# Patient Record
Sex: MALE | Race: BLACK OR AFRICAN AMERICAN | NOT HISPANIC OR LATINO | Employment: OTHER | URBAN - METROPOLITAN AREA
[De-identification: names, ages, dates, MRNs, and addresses within clinical notes are randomized per-mention and may not be internally consistent; named-entity substitution may affect disease eponyms.]

---

## 2017-01-06 ENCOUNTER — DOCUMENTATION ONLY (OUTPATIENT)
Dept: REHABILITATION | Facility: HOSPITAL | Age: 57
End: 2017-01-06

## 2017-01-06 DIAGNOSIS — M54.41 CHRONIC MIDLINE LOW BACK PAIN WITH RIGHT-SIDED SCIATICA: Primary | ICD-10-CM

## 2017-01-06 DIAGNOSIS — M62.81 MUSCLE WEAKNESS: ICD-10-CM

## 2017-01-06 DIAGNOSIS — G89.29 CHRONIC MIDLINE LOW BACK PAIN WITH RIGHT-SIDED SCIATICA: Primary | ICD-10-CM

## 2017-01-06 NOTE — PROGRESS NOTES
PHYSICAL THERAPY DISCHARGE SUMMARY    Name: Chilo Calvillo  Referring Provider:Cezar Uriostegui MD   PT Order: PT evaluate and treat   Clinical #: [id number]  Discharge Summary Date: 1/6/2017  Diagnosis:   1. Chronic midline low back pain with right-sided sciatica     2. Muscle weakness         Patient was seen for 4 OP PT visits from 11-22-16 to 11-30-16. Pt cancelled/no show visit 1 scheduled sessions. Treatment included: evaluation, HEP, pt education, aquatic therapy, ther ex. PT unable to fully assess goal achievement as pt did not return for follow up sessions/did not reschedule follow up visits. This patient is discharged from OP PT Services.

## 2017-07-14 ENCOUNTER — CLINICAL SUPPORT (OUTPATIENT)
Dept: AUDIOLOGY | Facility: CLINIC | Age: 57
End: 2017-07-14
Payer: OTHER GOVERNMENT

## 2017-07-14 ENCOUNTER — OFFICE VISIT (OUTPATIENT)
Dept: OTOLARYNGOLOGY | Facility: CLINIC | Age: 57
End: 2017-07-14
Payer: OTHER GOVERNMENT

## 2017-07-14 VITALS — BODY MASS INDEX: 40.43 KG/M2 | WEIGHT: 315 LBS | HEIGHT: 74 IN

## 2017-07-14 DIAGNOSIS — H90.3 SENSORINEURAL HEARING LOSS, BILATERAL: Primary | ICD-10-CM

## 2017-07-14 PROCEDURE — 99213 OFFICE O/P EST LOW 20 MIN: CPT | Mod: PBBFAC | Performed by: OTOLARYNGOLOGY

## 2017-07-14 PROCEDURE — 99214 OFFICE O/P EST MOD 30 MIN: CPT | Mod: S$PBB,,, | Performed by: OTOLARYNGOLOGY

## 2017-07-14 PROCEDURE — 99999 PR PBB SHADOW E&M-EST. PATIENT-LVL III: CPT | Mod: PBBFAC,,, | Performed by: OTOLARYNGOLOGY

## 2017-07-14 NOTE — PROGRESS NOTES
Subjective:       Patient ID: Chilo Calvillo is a 56 y.o. male.    Chief Complaint: Hearing Loss    HPI: Ref Dr Crain for ?? HL.    Seen Dr LO in past.    Presents for ?? Second opinion re Meniere's Dz?    Seeing Dr Tapia, wants to do surgery.    No dizz eval today.    Hx  of Functional HL last visit and today.    In neck collar, walker.    Past Medical History: Patient has a past medical history of Arthritis; Dizziness; Hearing loss; Hyperlipidemia; Hypertension; and Mental disorder.    Past Surgical History: Patient has a past surgical history that includes Sinus surgery (1-26-14).    Social History: Patient reports that he quit smoking about 22 years ago. He does not have any smokeless tobacco history on file. He reports that he drinks about 29.4 oz of alcohol per week . He reports that he does not use drugs.    Family History: family history includes Hypertension in his father and mother; Rheum arthritis in his mother; Stroke in his father.    Medications:   Current Outpatient Prescriptions   Medication Sig    aspirin 81 MG Chew Take 81 mg by mouth once daily.    atorvastatin (LIPITOR) 20 MG tablet Take 20 mg by mouth once daily.    busPIRone (BUSPAR) 15 MG tablet Take 15 mg by mouth 3 (three) times daily.    carboxymethylcellulose (REFRESH PLUS) 0.5 % Dpet 1 drop 3 (three) times daily as needed.    cetirizine (ZYRTEC) 10 MG tablet Take 10 mg by mouth once daily.    diazepam (VALIUM) 5 MG tablet Take 5 mg by mouth every 6 (six) hours as needed for Anxiety.    gabapentin (NEURONTIN) 300 MG capsule Take 300 mg by mouth 3 (three) times daily.    guaifenesin (MUCINEX) 600 mg 12 hr tablet Take 1,200 mg by mouth 2 (two) times daily.    hydrochlorothiazide (HYDRODIURIL) 25 MG tablet Take 25 mg by mouth once daily.    ketotifen (ZADITOR) 0.025 % ophthalmic solution 1 drop 2 (two) times daily.    meclizine (ANTIVERT) 32 MG tablet Take 12.5 mg by mouth 3 (three) times daily as needed.    montelukast  (SINGULAIR) 10 mg tablet Take 10 mg by mouth every evening.    mupirocin (BACTROBAN) 2 % ointment Apply topically 3 (three) times daily.    ofloxacin (OCUFLOX) 0.3 % ophthalmic solution 1 drop 4 (four) times daily.    omeprazole (PRILOSEC) 20 MG capsule Take 20 mg by mouth once daily.    sildenafil (VIAGRA) 100 MG tablet Take 100 mg by mouth daily as needed for Erectile Dysfunction.    SODIUM CHLORIDE FOR INHALATION (SODIUM CHLORIDE 0.9%) 0.9 % nebulizer solution Take 3 mLs by nebulization as needed.    tramadol (ULTRAM) 50 mg tablet Take 50 mg by mouth every 6 (six) hours as needed for Pain.    trazodone (DESYREL) 100 MG tablet Take 100 mg by mouth every evening.    venlafaxine (EFFEXOR-XR) 150 MG Cp24 Take 75 mg by mouth once daily.     No current facility-administered medications for this visit.        Allergies: Patient has No Known Allergies.      Review of Systems   Constitutional: Negative.    HENT: Positive for hearing loss. Negative for ear discharge, ear pain and tinnitus.    Neurological: Positive for dizziness. Negative for seizures, syncope, facial asymmetry, speech difficulty, weakness, light-headedness and headaches.       Objective:      Physical Exam   Constitutional: He appears well-developed and well-nourished. No distress.   HENT:   Head: Normocephalic and atraumatic.   Right Ear: No lacerations. No drainage, swelling or tenderness. No foreign bodies. No mastoid tenderness. Tympanic membrane is not injected, not scarred, not perforated, not erythematous, not retracted and not bulging. Tympanic membrane mobility is normal. No middle ear effusion. No hemotympanum. Decreased hearing is noted.   Left Ear: No lacerations. No drainage, swelling or tenderness. No foreign bodies. No mastoid tenderness. Tympanic membrane is not injected, not scarred, not perforated, not erythematous, not retracted and not bulging. Tympanic membrane mobility is normal.  No middle ear effusion. No hemotympanum.  Decreased hearing is noted.   Mouth/Throat: Oropharynx is clear and moist. No oropharyngeal exudate.       Audio with Functional HL Bilateral.       Eyes: Pupils are equal, round, and reactive to light. Right eye exhibits normal extraocular motion and no nystagmus. Left eye exhibits normal extraocular motion and no nystagmus.   Neck: Normal range of motion.   Neurological: He is alert. He has normal strength. He displays no atrophy and no tremor. No cranial nerve deficit or sensory deficit. He exhibits normal muscle tone. He displays a negative Romberg sign. He displays no seizure activity. Coordination and gait normal.   Skin: He is not diaphoretic.               Assessment:       1. Asymmetrical hearing loss, bilateral /  Functional/ Dizz ? etiol       Plan:         F/U Dr Tapia in future.

## 2017-07-14 NOTE — PROGRESS NOTES
Inconsistent responses to pure tone stimuli that does not match SRT or word disrim testing.  Similar results were recorded in 2015.  Recommend retesting when patient can be more reliable with responses.

## 2017-07-14 NOTE — LETTER
July 14, 2017      [facility]  [address] [facility] [address]           [facility] - Otorhinolaryngology  [address] [place] [address]  Phone: [phone]  Fax: [phone]          Patient: Chilo Calvillo   MR Number: [id number]   YOB: 1960   Date of Visit: 7/14/2017       Dear [facility]:    Thank you for referring Chilo Calvillo to me for evaluation. Attached you will find relevant portions of my assessment and plan of care.    If you have questions, please do not hesitate to call me. I look forward to following Chilo Calvillo along with you.    Sincerely,    Martin Wu MD    Enclosure  CC:  No Recipients    If you would like to receive this communication electronically, please contact [email or web address] or [phone] to request more information on [employer] Link access.    For providers and/or their staff who would like to refer a patient to [facility], please contact us through our one-stop-shop provider referral line, [facility] , at [phone].    If you feel you have received this communication in error or would no longer like to receive these types of communications, please e-mail [email or web address]

## 2021-12-09 ENCOUNTER — OFFICE VISIT (OUTPATIENT)
Dept: OPHTHALMOLOGY | Facility: CLINIC | Age: 61
End: 2021-12-09
Payer: OTHER GOVERNMENT

## 2021-12-09 ENCOUNTER — CLINICAL SUPPORT (OUTPATIENT)
Dept: OPHTHALMOLOGY | Facility: CLINIC | Age: 61
End: 2021-12-09
Payer: OTHER GOVERNMENT

## 2021-12-09 DIAGNOSIS — H02.831 DERMATOCHALASIS OF BOTH UPPER EYELIDS: ICD-10-CM

## 2021-12-09 DIAGNOSIS — H02.423 MYOGENIC PTOSIS, ACQUIRED, BILATERAL: Primary | ICD-10-CM

## 2021-12-09 DIAGNOSIS — H02.834 DERMATOCHALASIS OF BOTH UPPER EYELIDS: ICD-10-CM

## 2021-12-09 PROCEDURE — 99999 PR PBB SHADOW E&M-EST. PATIENT-LVL III: CPT | Mod: PBBFAC,,, | Performed by: OPHTHALMOLOGY

## 2021-12-09 PROCEDURE — 92083 GOLDMANN PERIMETRY - OU - EXTENDED - BOTH EYES: ICD-10-PCS | Mod: 26,S$PBB,, | Performed by: OPHTHALMOLOGY

## 2021-12-09 PROCEDURE — 99204 PR OFFICE/OUTPT VISIT, NEW, LEVL IV, 45-59 MIN: ICD-10-PCS | Mod: S$PBB,,, | Performed by: OPHTHALMOLOGY

## 2021-12-09 PROCEDURE — 99999 PR PBB SHADOW E&M-EST. PATIENT-LVL III: ICD-10-PCS | Mod: PBBFAC,,, | Performed by: OPHTHALMOLOGY

## 2021-12-09 PROCEDURE — 99204 OFFICE O/P NEW MOD 45 MIN: CPT | Mod: S$PBB,,, | Performed by: OPHTHALMOLOGY

## 2021-12-09 PROCEDURE — 99213 OFFICE O/P EST LOW 20 MIN: CPT | Mod: PBBFAC | Performed by: OPHTHALMOLOGY

## 2021-12-09 PROCEDURE — 92083 EXTENDED VISUAL FIELD XM: CPT | Mod: PBBFAC | Performed by: OPHTHALMOLOGY

## 2021-12-09 PROCEDURE — 92285 EXTERNAL OCULAR PHOTOGRAPHY: CPT | Mod: PBBFAC | Performed by: OPHTHALMOLOGY

## 2021-12-09 PROCEDURE — 92285 EXTERNAL PHOTOGRAPHY - OU - BOTH EYES: ICD-10-PCS | Mod: 26,S$PBB,, | Performed by: OPHTHALMOLOGY

## 2022-07-27 ENCOUNTER — CLINICAL SUPPORT (OUTPATIENT)
Dept: REHABILITATION | Facility: HOSPITAL | Age: 62
End: 2022-07-27
Payer: OTHER GOVERNMENT

## 2022-07-27 DIAGNOSIS — M54.42 CHRONIC BILATERAL LOW BACK PAIN WITH BILATERAL SCIATICA: ICD-10-CM

## 2022-07-27 DIAGNOSIS — M54.50 LOW BACK PAIN: ICD-10-CM

## 2022-07-27 DIAGNOSIS — G89.29 CHRONIC BILATERAL LOW BACK PAIN WITH BILATERAL SCIATICA: ICD-10-CM

## 2022-07-27 DIAGNOSIS — M54.41 CHRONIC BILATERAL LOW BACK PAIN WITH BILATERAL SCIATICA: ICD-10-CM

## 2022-07-27 DIAGNOSIS — M53.86 DECREASED RANGE OF MOTION OF LUMBAR SPINE: ICD-10-CM

## 2022-07-27 PROCEDURE — 97161 PT EVAL LOW COMPLEX 20 MIN: CPT

## 2022-07-27 NOTE — PLAN OF CARE
[facility]   Physical Therapy Initial Evaluation     Date: 7/27/2022   Name: Chilo Calvillo  [facility] Number: [id number]    Therapy Diagnosis:   Encounter Diagnoses   Name Primary?    Low back pain     Chronic bilateral low back pain with bilateral sciatica     Decreased range of motion of lumbar spine      Physician: Mony Drummond MD    Physician Orders: PT Eval and Treat   Medical Diagnosis from Referral: M54.50 (ICD-10-CM) - Low back pain  Evaluation Date: 7/27/2022  Authorization Period Expiration: TBD  Plan of Care Expiration: 10/27/2022  Progress Note Due: 8/27/2022  Visit # / Visits authorized: 1/ 1   FOTO: 0/5    Precautions: Standard     Time In: 8:00 (late)  Time Out: 8:30  Total Appointment Time (timed & untimed codes): 30 minutes      SUBJECTIVE     Date of onset: Chronic     History of current condition - Chilo reports: Pt injured his back in the [employer] in the 80's. Pt has been considered disabled since the 1998. Pt reports having extreme arthritis in his lower back. Pt's lower back pain radiated down to the left knee, but all the way down to the foot in the right lower extremity. Pt's pain is usually on the left time and can feel the pain on the left when he lays on his left side. Pt thought he had a bad hip but it was due to his lower back pain. Pt does not have extreme pain as he used to but still has pain. Pt has increased pain when sitting for a long time such as driving long distances. Pt feels his pain constant now but moving helps decrease lower back pain. Pt does not have pain when he wakes and only has slightly worse pain at the end of the day if he does too much.     Falls: Yes, - about a week ago. Pt has menieres disease    Imaging, MRI studies, bone scan films:     Prior Therapy: Yes, been a while  Social History:  lives with their spouse  Occupation: Disablied  Prior Level of Function: Independent   Current Level of Function: Independent    Pain:  Current  2/10, worst 10/10, best 1/10   Location: left and bilateral back    Description: Tingling and Numb, achy  Aggravating Factors: Sitting  Easing Factors: Movement    Patients goals: Pt would like to move better     Medical History:   Past Medical History:   Diagnosis Date    Arthritis     Dizziness     Hearing loss     Hyperlipidemia     Hypertension     Mental disorder        Surgical History:   Chilo Calvillo  has a past surgical history that includes Sinus surgery (1-26-14).    Medications:   Chilo has a current medication list which includes the following prescription(s): aspirin, atorvastatin, buspirone, carboxymethylcellulose, cetirizine, diazepam, gabapentin, guaifenesin, hydrochlorothiazide, ketotifen, meclizine, montelukast, mupirocin, ofloxacin, omeprazole, sildenafil, sodium chloride 0.9%, tramadol, trazodone, and venlafaxine.    Allergies:   Review of patient's allergies indicates:  No Known Allergies       OBJECTIVE         Lumbar AROM: Pain/Dysfunction with Movement:   Flexion: 60 degrees    Extension: 10 degrees Pain   Right side bending: 10 degrees Pain   Left side bending: 10 degrees Pain     Hip PROM Right Left Pain/Dysfunction with Movement   Hip Flexion  WNL WNL    Hip Extension  WNL WNL    Hip ER WNL WNL    Hip IR WNL WNL      Hip Right Right Right Left Left Left Pain/Dysfunction with Movement    AROM PROM MMT AROM PROM MMT    Flexion WNL WNL 5/5 WNL WNL 5/5    Abduction WNL WNL 4/5 WNL WNL 4/5    Adduction WNL WNL 4/5 WNL WNL 4/5       Knee Right Right Right Left Left Left Pain/Dysfunction with Movement    AROM PROM MMT AROM PROM MMT    Flexion WNL WNL 5/5 WNL WNL 5/5    Extension WNL WNL 5/5 WNL WNL 5/5      Ankle Right Right Right Left Left Left Pain/Dysfunction with Movement    AROM PROM MMT AROM PROM MMT    Dorsiflexion WNL WNL 5/5 WNL WNL 5/5        Repeated Motions Positive/Negative   Flexion Positive    Extension Negative     Neural Tension Positive/Negative   Crossed SLR +    Passive SLR w/ DF  -       Limitation/Restriction for FOTO Lumbar Survey    Therapist reviewed FOTO scores for Chilo Calvillo on 7/27/2022.   FOTO documents entered into EPIC - see Media section.    Limitation Score: 64%         TREATMENT       PATIENT EDUCATION AND HOME EXERCISES     Education provided:   - HEP, Role of PT    Written Home Exercises Provided: yes. Exercises were reviewed and Chilo was able to demonstrate them prior to the end of the session.  Chilo demonstrated good  understanding of the education provided. See EMR under Patient Instructions for exercises provided during therapy sessions.    ASSESSMENT     Chilo is a 61 y.o. male referred to outpatient Physical Therapy with a medical diagnosis of M54.50 (ICD-10-CM) - Low back pain. Patient presents with decreased lumbar range of motion, decreased hip strength, and positive crossed SLR. Pt had decreased low back pain with repeated flexion and was given HEP that will further explore pt's response to repeated lumbar flexion. Pt needs to increased core strength with increased range of motion of lumbar in order to increase functional mobility and decrease pain.     Patient prognosis is Good.   Patient will benefit from skilled outpatient Physical Therapy to address the deficits stated above and in the chart below, provide patient /family education, and to maximize patientt's level of independence.     Plan of care discussed with patient: Yes  Patient's spiritual, cultural and educational needs considered and patient is agreeable to the plan of care and goals as stated below:     Anticipated Barriers for therapy: None    Medical Necessity is demonstrated by the following  History  Co-morbidities and personal factors that may impact the plan of care Co-morbidities:   See Medical History     Personal Factors:   no deficits     low   Examination  Body Structures and Functions, activity limitations and participation restrictions that may impact the  plan of care Body Regions:   back  lower extremities    Body Systems:    ROM  strength    Participation Restrictions:   Work    Activity limitations:   Learning and applying knowledge  no deficits    General Tasks and Commands  no deficits    Communication  no deficits    Mobility  lifting and carrying objects    Self care  no deficits    Domestic Life  no deficits    Interactions/Relationships  no deficits    Life Areas  no deficits    Community and Social Life  no deficits         low   Clinical Presentation stable and uncomplicated low   Decision Making/ Complexity Score: low     Goals:  Short Term Goals (4 Weeks):  1. Pt will be compliant with HEP to supplement PT in decreasing pain with functional mobility.  2. Pt will perform pallof press with good control to demonstrate improved core strength.  3. Pt will improve lumbar ROM by 5 degrees in Extension to improve functional mobility.  4. Pt will improve impaired LE MMTs by 1/2 score to improve strength for functional tasks.  Long Term Goals (8 Weeks):   1. Pt will improve FOTO score to </= 51% limited to decrease perceived limitation with maintaining/changing body position.   2. Pt will perform lifting 10lbs from floor with good control to demonstrate improved core strength.  3. Pt will improve impaired LE MMTs by 1 score to improve strength for functional tasks.  4. Pt will report no pain during lumbar ROM to promote functional mobility.     PLAN   Plan of care Certification: 7/27/2022 to 10/27/2022.    Outpatient Physical Therapy 2 times weekly for 10 weeks to include the following interventions: Electrical Stimulation IFC, Tens, Dry Needling, Gait Training, Manual Therapy, Moist Heat/ Ice, Neuromuscular Re-ed, Patient Education, Therapeutic Activities and Therapeutic Exercise.     New Zaidi, PT      I CERTIFY THE NEED FOR THESE SERVICES FURNISHED UNDER THIS PLAN OF TREATMENT AND WHILE UNDER MY CARE   Physician's comments:     Physician's  Signature: ___________________________________________________

## 2022-08-02 ENCOUNTER — CLINICAL SUPPORT (OUTPATIENT)
Dept: REHABILITATION | Facility: HOSPITAL | Age: 62
End: 2022-08-02
Payer: OTHER GOVERNMENT

## 2022-08-02 DIAGNOSIS — M53.86 DECREASED RANGE OF MOTION OF LUMBAR SPINE: Primary | ICD-10-CM

## 2022-08-02 PROCEDURE — 97110 THERAPEUTIC EXERCISES: CPT | Performed by: PHYSICAL THERAPIST

## 2022-08-02 NOTE — PROGRESS NOTES
Physical Therapy Daily Treatment Note     Name: Chilo Calvillo  Clinic Number: [id number]    Therapy Diagnosis:   Encounter Diagnosis   Name Primary?    Decreased range of motion of lumbar spine Yes     Physician: Mony Drummond MD    Visit Date: 8/2/2022  Physician Orders: PT Eval and Treat   Medical Diagnosis from Referral: M54.50 (ICD-10-CM) - Low back pain  Evaluation Date: 7/27/2022  Authorization Period Expiration: TBD  Plan of Care Expiration: 10/27/2022  Progress Note Due: 8/27/2022  Visit # / Visits authorized: 1/ 14     Time In: 8:25 am  Time Out: 9:15 am  Total Billable Time: 40 minutes    Precautions: Standard    Subjective     Pt reports: back feels stiff.  States R lateral thigh is numb.  He was compliant with home exercise program.  Response to previous treatment: na  Functional change: na    Pain: 1/10  Location: bilateral back      Objective     Tight hams.  No TTP.    Chilo received therapeutic exercises to develop strength, ROM, flexibility, posture and core stabilization for 50 minutes including:  UBE x 8 min  LTR  HSS  PPT  SKTC  GTB hip abd in supine  GTB bridges  pilates [place] abd/add  Prone GTB hip abd and diagonals  Manual B hip stretching x 6 min  CP x 10 min      Home Exercises Provided and Patient Education Provided     Education provided:   - ice for pain    Written Home Exercises Provided: Patient instructed to cont prior HEP.  Exercises were reviewed and Chilo was able to demonstrate them prior to the end of the session.  Chilo demonstrated good  understanding of the education provided.     See EMR under Patient Instructions for exercises provided prior visit.    Assessment     Tolerated exercises well with MIN pain.  Chilo Is progressing well towards his goals.   Pt prognosis is Fair.     Pt will continue to benefit from skilled outpatient physical therapy to address the deficits listed in the problem list box on initial evaluation, provide pt/family education and to  maximize pt's level of independence in the home and community environment.     Pt's spiritual, cultural and educational needs considered and pt agreeable to plan of care and goals.    Anticipated barriers to physical therapy: none    Goals: Goals:  Short Term Goals (4 Weeks):  1. Pt will be compliant with HEP to supplement PT in decreasing pain with functional mobility.  2. Pt will perform pallof press with good control to demonstrate improved core strength.  3. Pt will improve lumbar ROM by 5 degrees in Extension to improve functional mobility.  4. Pt will improve impaired LE MMTs by 1/2 score to improve strength for functional tasks.  Long Term Goals (8 Weeks):   1. Pt will improve FOTO score to </= 51% limited to decrease perceived limitation with maintaining/changing body position.   2. Pt will perform lifting 10lbs from floor with good control to demonstrate improved core strength.  3. Pt will improve impaired LE MMTs by 1 score to improve strength for functional tasks.  4. Pt will report no pain during lumbar ROM to promote functional mobility.          Plan     Continue PT per POC.    Brandon Villa, PT

## 2022-08-04 ENCOUNTER — CLINICAL SUPPORT (OUTPATIENT)
Dept: REHABILITATION | Facility: HOSPITAL | Age: 62
End: 2022-08-04
Payer: OTHER GOVERNMENT

## 2022-08-04 DIAGNOSIS — M53.86 DECREASED RANGE OF MOTION OF LUMBAR SPINE: Primary | ICD-10-CM

## 2022-08-04 PROCEDURE — 97110 THERAPEUTIC EXERCISES: CPT

## 2022-08-10 ENCOUNTER — CLINICAL SUPPORT (OUTPATIENT)
Dept: REHABILITATION | Facility: HOSPITAL | Age: 62
End: 2022-08-10
Payer: OTHER GOVERNMENT

## 2022-08-10 DIAGNOSIS — M53.86 DECREASED RANGE OF MOTION OF LUMBAR SPINE: Primary | ICD-10-CM

## 2022-08-10 PROCEDURE — 97110 THERAPEUTIC EXERCISES: CPT

## 2022-08-10 NOTE — PROGRESS NOTES
Physical Therapy Daily Treatment Note     Name: Chilo Calvillo  Clinic Number: [id number]    Therapy Diagnosis:   Encounter Diagnosis   Name Primary?    Decreased range of motion of lumbar spine Yes     Physician: Mony Drummond MD    Visit Date: 8/10/2022  Physician Orders: PT Eval and Treat   Medical Diagnosis from Referral: M54.50 (ICD-10-CM) - Low back pain  Evaluation Date: 7/27/2022  Authorization Period Expiration: 10/20/2022  Plan of Care Expiration: 10/27/2022  Progress Note Due: 8/27/2022  Visit # / Visits authorized: 3/ 14     Time In: 9:15  Time Out: 10:00  Total Billable Time: 45 minutes    Precautions: Standard    Subjective     Pt reports:  That his back has been feeling better. Pt has been receiving acupuncture as well as PT.    He was compliant with home exercise program.  Response to previous treatment: soreness  Functional change: na    Pain: 1/10  Location: bilateral back      Objective     No TTP.    Chilo received therapeutic exercises to develop strength, ROM, flexibility, posture and core stabilization for 45 minutes including:  NuStep 5 min Lvl 2  LTR in figure 4 30x each  HSS 2x30s  Bridges 30x GTB  SLR 2x15  pilates [place] abd/add 30x  Clams GTB 30x   PPT 30x  Standing Hip abduction 30x RTB   Standing Hip extension 30x RTB     NP   DKTC 30x  SKTC  GTB hip abd in supine  Prone GTB hip abd and diagonals  Manual B hip stretching x 6 min  CP x 10 min      Home Exercises Provided and Patient Education Provided     Education provided:   - ice for pain    Written Home Exercises Provided: Patient instructed to cont prior HEP.  Exercises were reviewed and Chilo was able to demonstrate them prior to the end of the session.  Chilo demonstrated good  understanding of the education provided.     See EMR under Patient Instructions for exercises provided prior visit.    Assessment     Pt had muscle fatigue with standing hip exercises and needed to hold onto mat for balance. Pt reports that he is  feeling much better between coming to physical therapy and receiving acupuncture. Plan to continue to monitor pt's response to treatment.     Chilo Is progressing well towards his goals.   Pt prognosis is Fair.     Pt will continue to benefit from skilled outpatient physical therapy to address the deficits listed in the problem list box on initial evaluation, provide pt/family education and to maximize pt's level of independence in the home and community environment.     Pt's spiritual, cultural and educational needs considered and pt agreeable to plan of care and goals.    Anticipated barriers to physical therapy: none    Goals: Goals:  Short Term Goals (4 Weeks):  1. Pt will be compliant with HEP to supplement PT in decreasing pain with functional mobility.  2. Pt will perform pallof press with good control to demonstrate improved core strength.  3. Pt will improve lumbar ROM by 5 degrees in Extension to improve functional mobility.  4. Pt will improve impaired LE MMTs by 1/2 score to improve strength for functional tasks.  Long Term Goals (8 Weeks):   1. Pt will improve FOTO score to </= 51% limited to decrease perceived limitation with maintaining/changing body position.   2. Pt will perform lifting 10lbs from floor with good control to demonstrate improved core strength.  3. Pt will improve impaired LE MMTs by 1 score to improve strength for functional tasks.  4. Pt will report no pain during lumbar ROM to promote functional mobility.          Plan     Continue PT per POC.    New Zaidi, PT

## 2022-08-12 ENCOUNTER — CLINICAL SUPPORT (OUTPATIENT)
Dept: REHABILITATION | Facility: HOSPITAL | Age: 62
End: 2022-08-12
Payer: OTHER GOVERNMENT

## 2022-08-12 DIAGNOSIS — M53.86 DECREASED RANGE OF MOTION OF LUMBAR SPINE: Primary | ICD-10-CM

## 2022-08-12 PROCEDURE — 97110 THERAPEUTIC EXERCISES: CPT

## 2022-08-12 NOTE — PROGRESS NOTES
Physical Therapy Daily Treatment Note     Name: Chilo Calvillo  Clinic Number: [id number]    Therapy Diagnosis:   Encounter Diagnosis   Name Primary?    Decreased range of motion of lumbar spine Yes     Physician: Mony Drummond MD    Visit Date: 8/12/2022  Physician Orders: PT Eval and Treat   Medical Diagnosis from Referral: M54.50 (ICD-10-CM) - Low back pain  Evaluation Date: 7/27/2022  Authorization Period Expiration: 10/20/2022  Plan of Care Expiration: 10/27/2022  Progress Note Due: 8/27/2022  Visit # / Visits authorized: 4/ 14     Time In: 8:10 (late)  Time Out: 8:50  Total Billable Time: 40 minutes    Precautions: Standard    Subjective     Pt reports:  That he had acupuncture yesterday and had slight pain but it went away after needles were removed.     He was compliant with home exercise program.  Response to previous treatment: soreness  Functional change: na    Pain: 1/10  Location: bilateral back      Objective     No TTP.    Chilo received therapeutic exercises to develop strength, ROM, flexibility, posture and core stabilization for 40 minutes including:    NuStep 5 min Lvl 2  LTR in figure 4 30x each  HSS 2x30s  Bridges 30x SL ST  SLR 2x15  pilates [place] abd/add 30x  Clams GTB 30x   PPT 30x  Standing Hip abduction 30x RTB   Standing Hip extension 30x RTB     NP   DKTC 30x  SKTC  GTB hip abd in supine  Prone GTB hip abd and diagonals  Manual B hip stretching x 6 min  CP x 10 min      Home Exercises Provided and Patient Education Provided     Education provided:   - ice for pain    Written Home Exercises Provided: Patient instructed to cont prior HEP.  Exercises were reviewed and Chilo was able to demonstrate them prior to the end of the session.  Chilo demonstrated good  understanding of the education provided.     See EMR under Patient Instructions for exercises provided prior visit.    Assessment     Pt had muscle fatigue and in hips during exercising but no increase in pain other than  muscle fatigue. Plan to progress pt to more standing exercises next session.    Chilo Is progressing well towards his goals.   Pt prognosis is Fair.     Pt will continue to benefit from skilled outpatient physical therapy to address the deficits listed in the problem list box on initial evaluation, provide pt/family education and to maximize pt's level of independence in the home and community environment.     Pt's spiritual, cultural and educational needs considered and pt agreeable to plan of care and goals.    Anticipated barriers to physical therapy: none    Goals: Goals:  Short Term Goals (4 Weeks):  1. Pt will be compliant with HEP to supplement PT in decreasing pain with functional mobility.  2. Pt will perform pallof press with good control to demonstrate improved core strength.  3. Pt will improve lumbar ROM by 5 degrees in Extension to improve functional mobility.  4. Pt will improve impaired LE MMTs by 1/2 score to improve strength for functional tasks.  Long Term Goals (8 Weeks):   1. Pt will improve FOTO score to </= 51% limited to decrease perceived limitation with maintaining/changing body position.   2. Pt will perform lifting 10lbs from floor with good control to demonstrate improved core strength.  3. Pt will improve impaired LE MMTs by 1 score to improve strength for functional tasks.  4. Pt will report no pain during lumbar ROM to promote functional mobility.          Plan     Continue PT per POC.    New Zaidi, PT

## 2022-08-15 ENCOUNTER — CLINICAL SUPPORT (OUTPATIENT)
Dept: REHABILITATION | Facility: HOSPITAL | Age: 62
End: 2022-08-15
Payer: OTHER GOVERNMENT

## 2022-08-15 DIAGNOSIS — M53.86 DECREASED RANGE OF MOTION OF LUMBAR SPINE: Primary | ICD-10-CM

## 2022-08-15 PROCEDURE — 97110 THERAPEUTIC EXERCISES: CPT

## 2022-08-15 NOTE — PROGRESS NOTES
Physical Therapy Daily Treatment Note     Name: Chilo Calvillo  Clinic Number: [id number]    Therapy Diagnosis:   Encounter Diagnosis   Name Primary?    Decreased range of motion of lumbar spine Yes     Physician: Mony Drummond MD    Visit Date: 8/15/2022  Physician Orders: PT Eval and Treat   Medical Diagnosis from Referral: M54.50 (ICD-10-CM) - Low back pain  Evaluation Date: 7/27/2022  Authorization Period Expiration: 10/20/2022  Plan of Care Expiration: 10/27/2022  Progress Note Due: 8/27/2022  Visit # / Visits authorized: 5/ 14     Time In: 9:00  Time Out: 9:45  Total Billable Time: 45 minutes    Precautions: Standard    Subjective     Pt reports:  That he is feeling much better but only has slight hip pain    He was compliant with home exercise program.  Response to previous treatment: soreness  Functional change: na    Pain: 1/10  Location: bilateral back      Objective     No TTP.    Chilo received therapeutic exercises to develop strength, ROM, flexibility, posture and core stabilization for 45 minutes including:    NuStep 5 min Lvl 2  LTR in figure 4 30x each  HSS 2x30s  Bridges 30x SL ST  SLR 2x15  pilates [place] abd/add 30x  Clams BTB 30x   PPT 30x  Side Steps 20x  Standing Hip abduction 30x BTB   Standing Hip extension 30x BTB   Paloff Press 15x each    NP   DKTC 30x  SKTC  GTB hip abd in supine  Prone GTB hip abd and diagonals  Manual B hip stretching x 6 min  CP x 10 min      Home Exercises Provided and Patient Education Provided     Education provided:   - ice for pain    Written Home Exercises Provided: Patient instructed to cont prior HEP.  Exercises were reviewed and Chilo was able to demonstrate them prior to the end of the session.  Chilo demonstrated good  understanding of the education provided.     See EMR under Patient Instructions for exercises provided prior visit.    Assessment     Pt was able to perform new exercises without increase in symptoms. Pt continue to progress well and  reports having decreased pain. Plan to continue to increase amount of standing exercises.     Chilo Is progressing well towards his goals.   Pt prognosis is Fair.     Pt will continue to benefit from skilled outpatient physical therapy to address the deficits listed in the problem list box on initial evaluation, provide pt/family education and to maximize pt's level of independence in the home and community environment.     Pt's spiritual, cultural and educational needs considered and pt agreeable to plan of care and goals.    Anticipated barriers to physical therapy: none    Goals: Goals:  Short Term Goals (4 Weeks):  1. Pt will be compliant with HEP to supplement PT in decreasing pain with functional mobility.  2. Pt will perform pallof press with good control to demonstrate improved core strength.  3. Pt will improve lumbar ROM by 5 degrees in Extension to improve functional mobility.  4. Pt will improve impaired LE MMTs by 1/2 score to improve strength for functional tasks.  Long Term Goals (8 Weeks):   1. Pt will improve FOTO score to </= 51% limited to decrease perceived limitation with maintaining/changing body position.   2. Pt will perform lifting 10lbs from floor with good control to demonstrate improved core strength.  3. Pt will improve impaired LE MMTs by 1 score to improve strength for functional tasks.  4. Pt will report no pain during lumbar ROM to promote functional mobility.          Plan     Continue PT per POC.    New Zaidi, PT        Male

## 2022-08-17 ENCOUNTER — CLINICAL SUPPORT (OUTPATIENT)
Dept: REHABILITATION | Facility: HOSPITAL | Age: 62
End: 2022-08-17
Payer: OTHER GOVERNMENT

## 2022-08-17 DIAGNOSIS — M53.86 DECREASED RANGE OF MOTION OF LUMBAR SPINE: Primary | ICD-10-CM

## 2022-08-17 PROCEDURE — 97110 THERAPEUTIC EXERCISES: CPT

## 2022-08-17 NOTE — PROGRESS NOTES
Physical Therapy Daily Treatment Note     Name: Chilo Calivllo  Clinic Number: [id number]    Therapy Diagnosis:   Encounter Diagnosis   Name Primary?    Decreased range of motion of lumbar spine Yes     Physician: Mony Drummond MD    Visit Date: 8/17/2022  Physician Orders: PT Eval and Treat   Medical Diagnosis from Referral: M54.50 (ICD-10-CM) - Low back pain  Evaluation Date: 7/27/2022  Authorization Period Expiration: 10/20/2022  Plan of Care Expiration: 10/27/2022  Progress Note Due: 8/27/2022  Visit # / Visits authorized: 6/ 14     Time In: 9:15  Time Out: 9:55  Total Billable Time: 40 minutes    Precautions: Standard    Subjective     Pt reports:  He was a little sorer after leaving last treatment session     He was compliant with home exercise program.  Response to previous treatment: soreness  Functional change: progressing    Pain: 1/10  Location: bilateral back      Objective       Chilo received therapeutic exercises to develop strength, ROM, flexibility, posture and core stabilization for 40 minutes including:    NuStep 5 min Lvl 2  LTR in figure 4 30x each  HSS 2x30s  Bridges 30x SL ST  SLR 2x15  pilates [place] abd/add 30x  PPT 30x  Side Steps 20x  Standing Hip abduction 30x BTB   Standing Hip extension 30x BTB   Paloff Press 15x each    NP   Clams BTB 30x   DKTC 30x  SKTC  GTB hip abd in supine  Prone GTB hip abd and diagonals  Manual B hip stretching x 6 min  CP x 10 min      Home Exercises Provided and Patient Education Provided     Education provided:   - ice for pain    Written Home Exercises Provided: Patient instructed to cont prior HEP.  Exercises were reviewed and Chilo was able to demonstrate them prior to the end of the session.  Chilo demonstrated good  understanding of the education provided.     See EMR under Patient Instructions for exercises provided prior visit.    Assessment     Pt continue to progress and have decrease lower back pain. Pt is able to tolerate exercises  standing but does get fatigued. Plan to increase amount of standing exercises.   Chilo Is progressing well towards his goals.   Pt prognosis is Fair.     Pt will continue to benefit from skilled outpatient physical therapy to address the deficits listed in the problem list box on initial evaluation, provide pt/family education and to maximize pt's level of independence in the home and community environment.     Pt's spiritual, cultural and educational needs considered and pt agreeable to plan of care and goals.    Anticipated barriers to physical therapy: none    Goals: Goals:  Short Term Goals (4 Weeks):  1. Pt will be compliant with HEP to supplement PT in decreasing pain with functional mobility.  2. Pt will perform pallof press with good control to demonstrate improved core strength.  3. Pt will improve lumbar ROM by 5 degrees in Extension to improve functional mobility.  4. Pt will improve impaired LE MMTs by 1/2 score to improve strength for functional tasks.  Long Term Goals (8 Weeks):   1. Pt will improve FOTO score to </= 51% limited to decrease perceived limitation with maintaining/changing body position.   2. Pt will perform lifting 10lbs from floor with good control to demonstrate improved core strength.  3. Pt will improve impaired LE MMTs by 1 score to improve strength for functional tasks.  4. Pt will report no pain during lumbar ROM to promote functional mobility.       Plan     Continue PT per POC.    New Zaidi, PT

## 2022-08-22 ENCOUNTER — CLINICAL SUPPORT (OUTPATIENT)
Dept: REHABILITATION | Facility: HOSPITAL | Age: 62
End: 2022-08-22
Payer: OTHER GOVERNMENT

## 2022-08-22 DIAGNOSIS — M53.86 DECREASED RANGE OF MOTION OF LUMBAR SPINE: Primary | ICD-10-CM

## 2022-08-22 PROCEDURE — 97110 THERAPEUTIC EXERCISES: CPT

## 2022-08-22 NOTE — PROGRESS NOTES
Physical Therapy Daily Treatment Note     Name: Chilo Calvillo  Clinic Number: [id number]    Therapy Diagnosis:   Encounter Diagnosis   Name Primary?    Decreased range of motion of lumbar spine Yes     Physician: Mony Drummond MD    Visit Date: 8/22/2022  Physician Orders: PT Eval and Treat   Medical Diagnosis from Referral: M54.50 (ICD-10-CM) - Low back pain  Evaluation Date: 7/27/2022  Authorization Period Expiration: 10/20/2022  Plan of Care Expiration: 10/27/2022  Progress Note Due: 8/27/2022  Visit # / Visits authorized: 7/ 14   FOTO: 7/7    Time In: 9:05  Time Out: 9:45  Total Billable Time: 40 minutes    Precautions: Standard    Subjective     Pt reports:  He felt a little under the weather this weekend but feels better now.     He was compliant with home exercise program.  Response to previous treatment: soreness  Functional change: progressing    Pain: 1/10  Location: bilateral back      Objective       Chilo received therapeutic exercises to develop strength, ROM, flexibility, posture and core stabilization for 40 minutes including:    NuStep 5 min Lvl 3  LTR in figure 4 30x each  HSS 3x30s  Bridges 30x SL ST  SLR 2x15 1#  pilates [place] abd/add 30x  Side Steps 20x  Standing Hip abduction 30x BTB   Standing Hip extension 30x BTB   Paloff Press 2x15 each 10#  Cable pulldown 2x15 each 7#    NP   Clams BTB 30x   DKTC 30x  SKTC  GTB hip abd in supine  Prone GTB hip abd and diagonals  Manual B hip stretching x 6 min  CP x 10 min      Home Exercises Provided and Patient Education Provided     Education provided:   - ice for pain    Written Home Exercises Provided: Patient instructed to cont prior HEP.  Exercises were reviewed and Chilo was able to demonstrate them prior to the end of the session.  Chiol demonstrated good  understanding of the education provided.     See EMR under Patient Instructions for exercises provided prior visit.    Assessment     Pt had more discomfort in his hips and lower back  today than he has had in the past. Pt believes his pain is due to whatever illness he had this weekend. Plan to monitor pt's response to treatment session.     Chilo Is progressing well towards his goals.   Pt prognosis is Fair.     Pt will continue to benefit from skilled outpatient physical therapy to address the deficits listed in the problem list box on initial evaluation, provide pt/family education and to maximize pt's level of independence in the home and community environment.     Pt's spiritual, cultural and educational needs considered and pt agreeable to plan of care and goals.    Anticipated barriers to physical therapy: none    Goals: Goals:  Short Term Goals (4 Weeks):  1. Pt will be compliant with HEP to supplement PT in decreasing pain with functional mobility.  2. Pt will perform pallof press with good control to demonstrate improved core strength.  3. Pt will improve lumbar ROM by 5 degrees in Extension to improve functional mobility.  4. Pt will improve impaired LE MMTs by 1/2 score to improve strength for functional tasks.  Long Term Goals (8 Weeks):   1. Pt will improve FOTO score to </= 51% limited to decrease perceived limitation with maintaining/changing body position.   2. Pt will perform lifting 10lbs from floor with good control to demonstrate improved core strength.  3. Pt will improve impaired LE MMTs by 1 score to improve strength for functional tasks.  4. Pt will report no pain during lumbar ROM to promote functional mobility.       Plan     Continue PT per POC.    New Zaidi, PT

## 2022-08-24 ENCOUNTER — CLINICAL SUPPORT (OUTPATIENT)
Dept: REHABILITATION | Facility: HOSPITAL | Age: 62
End: 2022-08-24
Payer: OTHER GOVERNMENT

## 2022-08-24 DIAGNOSIS — M53.86 DECREASED RANGE OF MOTION OF LUMBAR SPINE: Primary | ICD-10-CM

## 2022-08-24 PROCEDURE — 97110 THERAPEUTIC EXERCISES: CPT

## 2022-08-24 NOTE — PROGRESS NOTES
Physical Therapy Daily Treatment Note     Name: Chilo Calvillo  Clinic Number: [id number]    Therapy Diagnosis:   Encounter Diagnosis   Name Primary?    Decreased range of motion of lumbar spine Yes     Physician: Mony Drummond MD    Visit Date: 8/24/2022  Physician Orders: PT Eval and Treat   Medical Diagnosis from Referral: M54.50 (ICD-10-CM) - Low back pain  Evaluation Date: 7/27/2022  Authorization Period Expiration: 10/20/2022  Plan of Care Expiration: 10/27/2022  Progress Note Due: 8/27/2022  Visit # / Visits authorized: 8/ 14   FOTO: 7/7    Time In: 9:20  Time Out: 10:00  Total Billable Time: 40 minutes    Precautions: Standard    Subjective     Pt reports:  His left hip has been bothering him lately.     He was compliant with home exercise program.  Response to previous treatment: soreness  Functional change: progressing    Pain: 3/10  Location: bilateral back / hip     Objective       Chilo received therapeutic exercises to develop strength, ROM, flexibility, posture and core stabilization for 40 minutes including:    NuStep 6 min Lvl 4  SKTC 3x10 each   LTR in figure 4 30x each  HSS 2x30s  Bridges 30x SL ST  SLR 2x15 1#  pilates [place] abd/add 30x  Side Steps 20x BTB  Standing Hip abduction 30x BTB   Standing Hip extension 30x BTB   Paloff Press 2x15 each 10#  Cable pulldown 2x15 each 7#    NP   Clams BTB 30x   DKTC 30x  SKTC  GTB hip abd in supine  Prone GTB hip abd and diagonals  Manual B hip stretching x 6 min  CP x 10 min      Home Exercises Provided and Patient Education Provided     Education provided:   - ice for pain    Written Home Exercises Provided: Patient instructed to cont prior HEP.  Exercises were reviewed and Chilo was able to demonstrate them prior to the end of the session.  Chilo demonstrated good  understanding of the education provided.     See EMR under Patient Instructions for exercises provided prior visit.    Assessment     Pt reported decreased pain after performing  figure 4 rotations. Pt had increased muscle fatigue and pain with stand hip exercises, specifically with side steps. Pt reported that he could continue the exercises through the pain. Plan to continue to monitor pt's response to treatment.     Chilo Is progressing well towards his goals.   Pt prognosis is Fair.     Pt will continue to benefit from skilled outpatient physical therapy to address the deficits listed in the problem list box on initial evaluation, provide pt/family education and to maximize pt's level of independence in the home and community environment.     Pt's spiritual, cultural and educational needs considered and pt agreeable to plan of care and goals.    Anticipated barriers to physical therapy: none    Goals: Goals:  Short Term Goals (4 Weeks):  1. Pt will be compliant with HEP to supplement PT in decreasing pain with functional mobility.  2. Pt will perform pallof press with good control to demonstrate improved core strength.  3. Pt will improve lumbar ROM by 5 degrees in Extension to improve functional mobility.  4. Pt will improve impaired LE MMTs by 1/2 score to improve strength for functional tasks.  Long Term Goals (8 Weeks):   1. Pt will improve FOTO score to </= 51% limited to decrease perceived limitation with maintaining/changing body position.   2. Pt will perform lifting 10lbs from floor with good control to demonstrate improved core strength.  3. Pt will improve impaired LE MMTs by 1 score to improve strength for functional tasks.  4. Pt will report no pain during lumbar ROM to promote functional mobility.       Plan     Continue PT per POC.    New Zaidi, PT

## 2022-09-01 ENCOUNTER — CLINICAL SUPPORT (OUTPATIENT)
Dept: REHABILITATION | Facility: HOSPITAL | Age: 62
End: 2022-09-01
Payer: OTHER GOVERNMENT

## 2022-09-01 DIAGNOSIS — M53.86 DECREASED RANGE OF MOTION OF LUMBAR SPINE: Primary | ICD-10-CM

## 2022-09-01 PROCEDURE — 97110 THERAPEUTIC EXERCISES: CPT

## 2022-09-01 NOTE — PROGRESS NOTES
Physical Therapy Daily Treatment Note     Name: Chilo Calvillo  [facility] Number: [id number]    Therapy Diagnosis:   Encounter Diagnosis   Name Primary?    Decreased range of motion of lumbar spine Yes       Physician: Mony Drummond MD    Visit Date: 9/1/2022  Physician Orders: PT Eval and Treat   Medical Diagnosis from Referral: M54.50 (ICD-10-CM) - Low back pain  Evaluation Date: 7/27/2022  Authorization Period Expiration: 10/20/2022  Plan of Care Expiration: 10/27/2022  Progress Note Due: 8/27/2022  Visit # / Visits authorized: 9/ 14   FOTO: 7/7    Time In: 10:40  Time Out: 11:20  Total Billable Time: 40 minutes    Precautions: Standard    Subjective     Pt reports:  He had appointment with acupuncturist and reports he feels better after.     He was compliant with home exercise program.  Response to previous treatment: soreness  Functional change: progressing    Pain: 3/10  Location: bilateral back / hip     Objective       Chilo received therapeutic exercises to develop strength, ROM, flexibility, posture and core stabilization for 40 minutes including:    NuStep 5 min Lvl 4  SKTC 3x10 each   LTR in figure 4 30x each  HSS 2x30s  Bridges [place] 30x   SLR 3x10 1#  Dying bugs without UE movement and ST 3x10  pilates [place] abd/add 30x  Standing Hip abduction 30x BTB   Standing Hip extension 30x BTB   Paloff Press 2x15 each 10#  Cable pulldown 2x15 each 7#  Cable chops 2x10 each 7#    NP  Side Steps 20x BTB  Clams BTB 30x   DKTC 30x  SKTC  GTB hip abd in supine  Prone GTB hip abd and diagonals  Manual B hip stretching x 6 min  CP x 10 min      Home Exercises Provided and Patient Education Provided     Education provided:   - ice for pain    Written Home Exercises Provided: Patient instructed to cont prior HEP.  Exercises were reviewed and Chilo was able to demonstrate them prior to the end of the session.  Chilo demonstrated good  understanding of the education provided.     See EMR under Patient Instructions  for exercises provided prior visit.    Assessment     Pt tolerated new exercises well but could not complete side step, or standing hip exercises due to pain. Pt reported he felt fine after session, but had worked up a sweat. Pt continues to progress with reports of decreased pain during the week.    Chilo Is progressing well towards his goals.   Pt prognosis is Fair.     Pt will continue to benefit from skilled outpatient physical therapy to address the deficits listed in the problem list box on initial evaluation, provide pt/family education and to maximize pt's level of independence in the home and community environment.     Pt's spiritual, cultural and educational needs considered and pt agreeable to plan of care and goals.    Anticipated barriers to physical therapy: none    Goals: Goals:  Short Term Goals (4 Weeks):  1. Pt will be compliant with HEP to supplement PT in decreasing pain with functional mobility.  2. Pt will perform pallof press with good control to demonstrate improved core strength.  3. Pt will improve lumbar ROM by 5 degrees in Extension to improve functional mobility.  4. Pt will improve impaired LE MMTs by 1/2 score to improve strength for functional tasks.  Long Term Goals (8 Weeks):   1. Pt will improve FOTO score to </= 51% limited to decrease perceived limitation with maintaining/changing body position.   2. Pt will perform lifting 10lbs from floor with good control to demonstrate improved core strength.  3. Pt will improve impaired LE MMTs by 1 score to improve strength for functional tasks.  4. Pt will report no pain during lumbar ROM to promote functional mobility.       Plan     Continue PT per POC.    New Zaidi, PT

## 2022-09-08 ENCOUNTER — CLINICAL SUPPORT (OUTPATIENT)
Dept: REHABILITATION | Facility: HOSPITAL | Age: 62
End: 2022-09-08
Payer: OTHER GOVERNMENT

## 2022-09-08 DIAGNOSIS — M53.86 DECREASED RANGE OF MOTION OF LUMBAR SPINE: Primary | ICD-10-CM

## 2022-09-08 PROCEDURE — 97110 THERAPEUTIC EXERCISES: CPT

## 2022-09-08 NOTE — PROGRESS NOTES
Physical Therapy Daily Treatment Note     Name: Chilo Calvillo  [facility] Number: [id number]    Therapy Diagnosis:   Encounter Diagnosis   Name Primary?    Decreased range of motion of lumbar spine Yes         Physician: Mony Drummond MD    Visit Date: 9/8/2022  Physician Orders: PT Eval and Treat   Medical Diagnosis from Referral: M54.50 (ICD-10-CM) - Low back pain  Evaluation Date: 7/27/2022  Authorization Period Expiration: 10/20/2022  Plan of Care Expiration: 10/27/2022  Progress Note Due: 8/27/2022  Visit # / Visits authorized: 10/ 14   FOTO: 7/7    Time In: 10:05  Time Out: 10:45  Total Billable Time: 40 minutes    Precautions: Standard    Subjective     Pt reports:  He feels alright with no complaints.     He was compliant with home exercise program.  Response to previous treatment: soreness  Functional change: progressing    Pain: 3/10  Location: bilateral back / hip     Objective       Chilo received therapeutic exercises to develop strength, ROM, flexibility, posture and core stabilization for 40 minutes including:      Hip Right Right Right Left Left Left Pain/Dysfunction with Movement    AROM PROM MMT AROM PROM MMT    Abduction WNL WNL 4+/5 WNL WNL 4+/5    Adduction WNL WNL 4+/5 WNL WNL 4+/5       Lumbar AROM: Pain/Dysfunction with Movement:   Flexion: 60 degrees    Extension: 15 degrees    Right side bending: 15 degrees Pain   Left side bending: 15 degrees Pain       NuStep 5 min Lvl 4  SKTC 3x10 each   LTR in figure 4 30x each  HSS 2x30s  Bridges Marching 3x10   SLR 3x10 1#  Dying bugs without UE movement and ST 3x10  pilates [place] abd/add 30x  Side Steps 20x BTB  Standing Hip abduction 30x BTB   Standing Hip extension 30x BTB   Mini Squats 2x10   Paloff Press 2x15 each 10#  Cable pulldown 2x15 each 7#  Cable chops 2x10 each 7#    NP    Clams BTB 30x   DKTC 30x  SKTC  GTB hip abd in supine  Prone GTB hip abd and diagonals  Manual B hip stretching x 6 min  CP x 10 min      Home Exercises Provided and  Patient Education Provided     Education provided:   - ice for pain    Written Home Exercises Provided: Patient instructed to cont prior HEP.  Exercises were reviewed and Chilo was able to demonstrate them prior to the end of the session.  Chilo demonstrated good  understanding of the education provided.     See EMR under Patient Instructions for exercises provided prior visit.    Assessment     Pt reports that his left hip bothers him during therapy but it feels better later. He also states he feels pretty good during his daily life. Pt has seen slight improvement with lumbar range of motion. Pt has meet 4/4 short term goals and is progressing well. Pt still has pain with lumbar range of motion.     Chilo Is progressing well towards his goals.   Pt prognosis is Fair.     Pt will continue to benefit from skilled outpatient physical therapy to address the deficits listed in the problem list box on initial evaluation, provide pt/family education and to maximize pt's level of independence in the home and community environment.     Pt's spiritual, cultural and educational needs considered and pt agreeable to plan of care and goals.    Anticipated barriers to physical therapy: none    Goals: Goals:  Short Term Goals (4 Weeks):  MET 1. Pt will be compliant with HEP to supplement PT in decreasing pain with functional mobility.  MET 2. Pt will perform pallof press with good control to demonstrate improved core strength.  MET 3. Pt will improve lumbar ROM by 5 degrees in Extension to improve functional mobility.  MET 4. Pt will improve impaired LE MMTs by 1/2 score to improve strength for functional tasks.  Long Term Goals (8 Weeks):   1. Pt will improve FOTO score to </= 51% limited to decrease perceived limitation with maintaining/changing body position.   2. Pt will perform lifting 10lbs from floor with good control to demonstrate improved core strength.  3. Pt will improve impaired LE MMTs by 1 score to improve  strength for functional tasks.  4. Pt will report no pain during lumbar ROM to promote functional mobility.       Plan     Continue PT per POC.    New Zaidi, PT

## 2022-09-14 ENCOUNTER — CLINICAL SUPPORT (OUTPATIENT)
Dept: REHABILITATION | Facility: HOSPITAL | Age: 62
End: 2022-09-14
Payer: OTHER GOVERNMENT

## 2022-09-14 DIAGNOSIS — M53.86 DECREASED RANGE OF MOTION OF LUMBAR SPINE: Primary | ICD-10-CM

## 2022-09-14 PROCEDURE — 97110 THERAPEUTIC EXERCISES: CPT

## 2022-09-14 NOTE — PROGRESS NOTES
Physical Therapy Daily Treatment Note     Name: Chilo Calvillo  Clinic Number: [id number]    Therapy Diagnosis:   Encounter Diagnosis   Name Primary?    Decreased range of motion of lumbar spine Yes     Physician: Mony Drummond MD    Visit Date: 9/14/2022    Physician Orders: PT Eval and Treat   Medical Diagnosis from Referral: M54.50 (ICD-10-CM) - Low back pain  Evaluation Date: 7/27/2022  Authorization Period Expiration: 10/20/2022  Plan of Care Expiration: 10/27/2022  Progress Note Due: 8/27/2022  Visit # / Visits authorized: 11/ 14   FOTO: 11    Time In: 10:03 am   Time Out: 10:45 am   Total Billable Time: 37 minutes    Precautions: Standard    Subjective     Pt reports: experiencing pain in (L) lumbar/hip today. .  He was compliant with home exercise program.  Response to previous treatment: soreness, but then subsides  Functional change: progressing    Pain: 4/10 (L) lumbar/hip      Objective       Chilo received therapeutic exercises to develop strength, ROM, flexibility, posture and core stabilization for 42 minutes including:    Bike x 5 min Lvl 4 supervised   SKTC 3x10 each   LTR in figure 4 30x each  HSS 2x30s  Bridges Marching 3x10   SLR 3x10 1#  Dying bugs without UE movement and ST 3x10 not performed  pilates [place] abd/add 30x  Side Steps 20x BTB not performed  Standing Hip abduction 30x BTB   Standing Hip extension 30x BTB   Mini Squats 2x10   Paloff Press 2x15 each 10#  Cable pulldown 2x10 10#  Cable chops 2x10 each 7# not performed      Home Exercises Provided and Patient Education Provided     Education provided:   - HEP     Written Home Exercises Provided: Patient instructed to cont prior HEP.  Chilo demonstrated good  understanding of the education provided.     See EMR under Patient Instructions for exercises provided prior visit.      Assessment     Pt received VC for proper technique when performing mini squats. Pt also received VC to avoid swinging LE when performing hip extensions.  Monitor response to therapy session and continue to progress as tolerated.   Chilo Is progressing well towards his goals.   Pt prognosis is Good.     Pt will continue to benefit from skilled outpatient physical therapy to address the deficits listed in the problem list box on initial evaluation, provide pt/family education and to maximize pt's level of independence in the home and community environment.     Pt's spiritual, cultural and educational needs considered and pt agreeable to plan of care and goals.    Anticipated barriers to physical therapy: none    Goals: Goals:  Short Term Goals (4 Weeks):  MET 1. Pt will be compliant with HEP to supplement PT in decreasing pain with functional mobility.  MET 2. Pt will perform pallof press with good control to demonstrate improved core strength.  MET 3. Pt will improve lumbar ROM by 5 degrees in Extension to improve functional mobility.  MET 4. Pt will improve impaired LE MMTs by 1/2 score to improve strength for functional tasks.  Long Term Goals (8 Weeks):   1. Pt will improve FOTO score to </= 51% limited to decrease perceived limitation with maintaining/changing body position.   2. Pt will perform lifting 10lbs from floor with good control to demonstrate improved core strength.  3. Pt will improve impaired LE MMTs by 1 score to improve strength for functional tasks.  4. Pt will report no pain during lumbar ROM to promote functional mobility.     Plan     Continue per POC, progress as tolerated    Sheri Dow, PT

## 2022-09-22 ENCOUNTER — CLINICAL SUPPORT (OUTPATIENT)
Dept: REHABILITATION | Facility: HOSPITAL | Age: 62
End: 2022-09-22
Payer: OTHER GOVERNMENT

## 2022-09-22 DIAGNOSIS — M53.86 DECREASED RANGE OF MOTION OF LUMBAR SPINE: Primary | ICD-10-CM

## 2022-09-22 PROCEDURE — 97110 THERAPEUTIC EXERCISES: CPT

## 2022-09-22 NOTE — PROGRESS NOTES
"  Physical Therapy Daily Treatment Note     Name: Chilo Calivllo  Clinic Number: [id number]    Therapy Diagnosis:   Encounter Diagnosis   Name Primary?    Decreased range of motion of lumbar spine Yes     Physician: Mony Drummond MD    Visit Date: 9/22/2022    Physician Orders: PT Eval and Treat   Medical Diagnosis from Referral: M54.50 (ICD-10-CM) - Low back pain  Evaluation Date: 7/27/2022  Authorization Period Expiration: 10/20/2022  Plan of Care Expiration: 10/27/2022  Progress Note Due: 8/27/2022  Visit # / Visits authorized: 12/ 14   FOTO: 12    Time In: 9:49 am   Time Out: 10:30 am   Total Billable Time: 36 minutes    Precautions: Standard    Subjective     Pt reports: that his back is so so, but overall better. Pt stated that his (L) hip bothers him due to arthritis. Pt stated that his (R) wrist is bothering him today.   He was not compliant with home exercise program.  Response to previous treatment: no adverse effects  Functional change: progressing    Pain: 7/10 (L) hip, 3/10 lumbar       Objective       Chilo received therapeutic exercises to develop strength, ROM, flexibility, posture and core stabilization for 41 minutes including:    Bike x 5 min Lvl 4 supervised   SKTC 3x10 each not performed  LTR 5"x10   HSS 3x30s  Bridges Marching 3x10 not performed  SLR 3x10 1#  Dying bugs without UE movement and ST 3x10 not performed  pilates [place] abd/add 30x  Standing Hip abduction 30x BTB   Standing Hip extension 30x BTB   Mini Squats 2x10 not performed   Paloff Press 2x15 each 10#  Shuttle leg press DL 3.5 c 2x10, 4.5 c 1x10  Cable pulldown 2x10 10# not performed  Cable chops 2x10 each 7# not performed    Home Exercises Provided and Patient Education Provided     Education provided:   - HEP     Written Home Exercises Provided: Patient instructed to cont prior HEP.   Chilo demonstrated good  understanding of the education provided.     See EMR under Patient Instructions for exercises provided prior " visit.      Assessment     Deferred cable pull downs and mini squat (holding on with UE) due to pt with report of experiencing pain in (R) wrist today. Pt was able to tolerate addition of shuttle today for LE strengthening.   Chilo Is progressing well towards his goals.   Pt prognosis is Fair.     Pt will continue to benefit from skilled outpatient physical therapy to address the deficits listed in the problem list box on initial evaluation, provide pt/family education and to maximize pt's level of independence in the home and community environment.     Pt's spiritual, cultural and educational needs considered and pt agreeable to plan of care and goals.    Anticipated barriers to physical therapy: none    Goals: Goals:  Short Term Goals (4 Weeks):  MET 1. Pt will be compliant with HEP to supplement PT in decreasing pain with functional mobility.  MET 2. Pt will perform pallof press with good control to demonstrate improved core strength.  MET 3. Pt will improve lumbar ROM by 5 degrees in Extension to improve functional mobility.  MET 4. Pt will improve impaired LE MMTs by 1/2 score to improve strength for functional tasks.  Long Term Goals (8 Weeks):   1. Pt will improve FOTO score to </= 51% limited to decrease perceived limitation with maintaining/changing body position.   2. Pt will perform lifting 10lbs from floor with good control to demonstrate improved core strength.  3. Pt will improve impaired LE MMTs by 1 score to improve strength for functional tasks.  4. Pt will report no pain during lumbar ROM to promote functional mobility.     Plan     Continue per POC, progress as tolerated    Sheri Dow, PT

## 2022-10-03 ENCOUNTER — CLINICAL SUPPORT (OUTPATIENT)
Dept: REHABILITATION | Facility: HOSPITAL | Age: 62
End: 2022-10-03
Payer: OTHER GOVERNMENT

## 2022-10-03 DIAGNOSIS — M53.86 DECREASED RANGE OF MOTION OF LUMBAR SPINE: Primary | ICD-10-CM

## 2022-10-03 PROCEDURE — 97110 THERAPEUTIC EXERCISES: CPT

## 2022-10-03 NOTE — PROGRESS NOTES
"  Physical Therapy Daily Treatment Note     Name: Chilo Calvillo  Clinic Number: [id number]    Therapy Diagnosis:   Encounter Diagnosis   Name Primary?    Decreased range of motion of lumbar spine Yes     Physician: Mony Drummond MD    Visit Date: 10/3/2022  Physician Orders: PT Eval and Treat   Medical Diagnosis from Referral: M54.50 (ICD-10-CM) - Low back pain  Evaluation Date: 7/27/2022  Authorization Period Expiration: 10/20/2022  Plan of Care Expiration: 10/27/2022  Progress Note Due: 8/27/2022  Visit # / Visits authorized: 13/ 14   FOTO: 13    Time In: 7:47 am   Time Out: 8:37 am   Total Billable Time: 45 minutes    Precautions: Standard    Subjective     Pt reports: that (L) hip is bothering him this morning. Pt stated that he has been experiencing cramps in lateral (L) hip this past week. Pt stated that he did a lot of walking while out of town in [place] last week.   He was not compliant with home exercise program.  Response to previous treatment: no adverse effects  Functional change: progressing    Pain: 8/10  Location: left hip      Objective       Chilo received therapeutic exercises to develop strength, ROM, flexibility, posture and core stabilization for 50 minutes including:    Nu step x 5 min supervised   SKTC 3x10 each not performed  Double KTC w/ball x20  Supine hip flexor/quad stretch off EOM 2x30"  LTR 5"x10   HSS 3x30s  Bridges Marching 3x10 not performed  SLR 3x10 1#  Dying bugs without UE movement and ST 3x10 not performed  pilates [place] abd/add 30x  Standing Hip abduction 30x BTB not performed  Standing Hip extension 30x BTB not performed  Mini Squats 2x10 not performed Shuttle leg press DL  4.5 c 2x10  Palloff walkouts 1x10 B double/BTB  Cable pulldown 2x10 10# not performed  Cable chops 2x10 each 7# not performed      Home Exercises Provided and Patient Education Provided     Education provided:   - HEP     Written Home Exercises Provided: Patient instructed to cont prior HEP.    " Chilo demonstrated good  understanding of the education provided.     See EMR under Patient Instructions for exercises provided prior visit.      Assessment     Pt tolerated therapy session without any c/o increased pain while performing therex.   Chilo Is progressing towards his goals.   Pt prognosis is Fair.     Pt will continue to benefit from skilled outpatient physical therapy to address the deficits listed in the problem list box on initial evaluation, provide pt/family education and to maximize pt's level of independence in the home and community environment.     Pt's spiritual, cultural and educational needs considered and pt agreeable to plan of care and goals.    Anticipated barriers to physical therapy: none    Goals: Goals:  Short Term Goals (4 Weeks):  MET 1. Pt will be compliant with HEP to supplement PT in decreasing pain with functional mobility.  MET 2. Pt will perform pallof press with good control to demonstrate improved core strength.  MET 3. Pt will improve lumbar ROM by 5 degrees in Extension to improve functional mobility.  MET 4. Pt will improve impaired LE MMTs by 1/2 score to improve strength for functional tasks.  Long Term Goals (8 Weeks):   1. Pt will improve FOTO score to </= 51% limited to decrease perceived limitation with maintaining/changing body position.   2. Pt will perform lifting 10lbs from floor with good control to demonstrate improved core strength.  3. Pt will improve impaired LE MMTs by 1 score to improve strength for functional tasks.  4. Pt will report no pain during lumbar ROM to promote functional mobility.     Plan     Continue per POC, progress as tolerated    Sheri Dow, PT

## 2022-10-10 ENCOUNTER — CLINICAL SUPPORT (OUTPATIENT)
Dept: REHABILITATION | Facility: HOSPITAL | Age: 62
End: 2022-10-10
Payer: OTHER GOVERNMENT

## 2022-10-10 DIAGNOSIS — M53.86 DECREASED RANGE OF MOTION OF LUMBAR SPINE: Primary | ICD-10-CM

## 2022-10-10 PROCEDURE — 97110 THERAPEUTIC EXERCISES: CPT

## 2022-10-10 NOTE — PROGRESS NOTES
"  Physical Therapy Daily Treatment Note/Discharge Summary     Name: Chilo Calvillo  Clinic Number: [id number]    Therapy Diagnosis:   Encounter Diagnosis   Name Primary?    Decreased range of motion of lumbar spine Yes     Physician: Mony Drummond MD    Visit Date: 10/10/2022    Physician Orders: PT Eval and Treat   Medical Diagnosis from Referral: M54.50 (ICD-10-CM) - Low back pain  Evaluation Date: 7/27/2022  Authorization Period Expiration: 10/20/2022  Plan of Care Expiration: 10/27/2022  Progress Note Due: 8/27/2022  Visit # / Visits authorized: 14/ 14   FOTO: 14    Time In: 11:00 am  (late arrival)   Time Out: 11:30 am  Total Billable Time: 25 minutes    Precautions: Standard    Subjective     Pt reports: feeling so-so. Pt stated that he has made progress since starting PT. Pt stated that he can continue with HEP.   He was compliant with home exercise program.  Response to previous treatment: no adverse effects  Functional change: improved FOTO score    Pain: 3/10  Location: left back      Objective       Chilo received objective measurements and  therapeutic exercises to develop strength, ROM, flexibility, posture and core stabilization for 30 minutes including:    Nu step x 5 min supervised  SKTC 3x10 each not performed  Double KTC w/ball x20 not performed  Supine hip flexor/quad stretch off EOM 2x30" not performed  LTR 5"x10   HSS 3x30s  Bridges Marching 3x10 not performed  SLR 3x10 1# not performed  Dying bugs without UE movement and ST 3x10 not performed  pilates [place] abd/add 30x  Standing Hip abduction 30x BTB not performed  Standing Hip extension 30x BTB not performed  Mini Squats 2x10 not performed Shuttle leg press DL  4.5 c 2x10 not performed  Palloff walkouts 1x10 B double/BTB  Cable pulldown 2x10 10# not performed  Cable chops 2x10 each 7# not performed         Hip Right Right Left Left Pain/Dysfunction with Movement     AROM MMT AROM MMT     Flexion WNL 5/5 WNL 5/5     Abduction WNL 4+/5 WNL " 4+/5     Adduction WNL 4+/5 WNL 4+/5       Lumbar AROM: Pain/Dysfunction with Movement:   Flexion: 90 degrees     Extension: 15 degrees C/o pain   Right side bendin degrees C/o Pain   Left side bendin degrees C/o Pain     FOTO: 55% limited         Home Exercises Provided and Patient Education Provided     Education provided:   - HEP    Written Home Exercises Provided: .  Exercises were reviewed and Chilo.  Chilo demonstrated good  understanding of the education provided. yes    See EMR under Patient Instructions for exercises provided 10/10/2022.      Assessment     Since initial evaluation on 22, pt has attended 14 PT follow up sessions. Pt demo improvement in lumbar AROM, improved (B) hip abduction and adduction MMT scores, and improved FOTO score compared to initial evaluation. Pt is appropriate for DC with HEP at this time.       Pt's spiritual, cultural and educational needs considered and pt agreeable to plan of care and goals.    Anticipated barriers to physical therapy: none    Goals:     Short Term Goals (4 Weeks):  MET 1. Pt will be compliant with HEP to supplement PT in decreasing pain with functional mobility.  MET 2. Pt will perform pallof press with good control to demonstrate improved core strength.  MET 3. Pt will improve lumbar ROM by 5 degrees in Extension to improve functional mobility.  MET 4. Pt will improve impaired LE MMTs by 1/2 score to improve strength for functional tasks.  Long Term Goals (8 Weeks):   NOT MET 1. Pt will improve FOTO score to </= 51% limited to decrease perceived limitation with maintaining/changing body position.   NOT TESTED 2. Pt will perform lifting 10lbs from floor with good control to demonstrate improved core strength.  NOT MET 3. Pt will improve impaired LE MMTs by 1 score to improve strength for functional tasks.  NOT MET 4. Pt will report no pain during lumbar ROM to promote functional mobility.     Plan     Discharge from PT, HEP, follow up  with MD marc Dow, PT

## 2024-04-17 DIAGNOSIS — M54.9 DORSALGIA, UNSPECIFIED: Primary | ICD-10-CM

## 2024-04-25 ENCOUNTER — CLINICAL SUPPORT (OUTPATIENT)
Dept: REHABILITATION | Facility: HOSPITAL | Age: 64
End: 2024-04-25
Payer: OTHER GOVERNMENT

## 2024-04-25 DIAGNOSIS — M54.9 DORSALGIA, UNSPECIFIED: Primary | ICD-10-CM

## 2024-04-25 PROCEDURE — 97810 ACUP 1/> WO ESTIM 1ST 15 MIN: CPT | Mod: PN

## 2024-04-25 PROCEDURE — 97811 ACUP 1/> W/O ESTIM EA ADD 15: CPT | Mod: PN

## 2024-04-25 NOTE — PROGRESS NOTES
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 Left side may be worse at times, had surgery on left side in the past. Right shoulder can't raise arm above level of shoulder has pain, Pain radiates up to his neck. With movement pain at a 10.       Aggravating Factors:  movement   Relieving Factors:  acupuncture and massage therapy  Quality:  Aching, Dull, Sharp, and Variable  Severity:  with movement '10'  Frequency:  continuously    Chronic low back pain, arthritis in his lower back. Pain at lower center of back and radiates out to the sides. Quality of pain varies, 8-10. Currently has had the pain all the time. His right leg has been numb. Lateral aspect of leg to the heel.      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    Has been getting [facility] from [facility] no [facility].    Medical necessity is demonstrated by the following IMPAIRMENTS: Medical Necessity: Decreased mobility limits day to day activities, social, and emergent situations and Decreased quality of life     Previous Treatments Tried:  Therapy  and Acupuncture    HEENT:  no headaches, seasonal allergies/sinus issues managed with medication    Chest:  no shortness of breath, no asthma    Digestion:     Taste/Thirst/Appetite: Appetite is good; History of acid reflux, not currently. Has both  constipation and diarrhea at times, history of constipation. Takes supplements to help.      Sleep:   sleep is okay, but recently the numbness in his leg has been bothering him at night.     Energy Levels:  good    Psychological Symptoms:  retired. Good support from wife.     Other Symptoms:     - Rheumatoid arthritis, worse in his left hand with weakness. Taking Chinese herbs which help but the hand still feels weak.    Objective     Observation: good alarcon    Tongue:  not assessed    Pulse:  not assessed    Treatment     Treatment Principles:  Regulate Qi & Blood; Dispel Bi; stop pain    Needle Set 1 -     Acupuncture points used:     Bilateral:  BL10, GB20, BL25, Osvaldo Claudy, BL32, BL33, lateral border sacrum +2, piriformis  Left:  GB21  Right:  LI15, TW14, SI9, SI11, janki top of shoulder +1. Lateral border sacrum +1  Centerline:      Needles In: 25  Needles Out: 25  Needles W/O STIM placed:   11:50  Needles W/O STIM removed:   12:15    Other Traditional Chinese Medicine Modalities -  none    Assessment     After treatment, patient felt  relaxed. He will see how he feels over the next few days     Patient prognosis is Fair.     Patient will continue to benefit from acupuncture treatment to address the deficits listed in the problem list box on initial evaluation, provide patient family education and to maximize pt's level of independence in the home and community environment.     Patient's spiritual, cultural and educational needs considered and pt agreeable to plan of care and goals.       Anticipated barriers to treatment:   none    Plan     Recommend 1 /week for 6 sessions then re-assess.      Recommendations:  none at this time    Education:  Patient is aware of cumulative effect of acupuncture

## 2024-05-02 ENCOUNTER — CLINICAL SUPPORT (OUTPATIENT)
Dept: REHABILITATION | Facility: HOSPITAL | Age: 64
End: 2024-05-02
Payer: OTHER GOVERNMENT

## 2024-05-02 DIAGNOSIS — M54.9 DORSALGIA, UNSPECIFIED: Primary | ICD-10-CM

## 2024-05-02 PROCEDURE — 97811 ACUP 1/> W/O ESTIM EA ADD 15: CPT | Mod: PN

## 2024-05-02 PROCEDURE — 97810 ACUP 1/> WO ESTIM 1ST 15 MIN: CPT | Mod: PN

## 2024-05-02 NOTE — PROGRESS NOTES
Acupuncture Treatment Note     Name: Chilo Calvillo  Clinic Number: [id number]    Traditional Chinese Medicine Diagnosis: Qi Stagnation, Blood Stasis, and Bi Syndrome    Date of Service: 5/2/2024     Medical Diagnosis: M549 dorsalgia, unspecified   Evaluation Date: 04/25/2024    Visit #/Visits authorized: 2/ 8   ([id number] issued 4/16/2024; initial visit 4/25/24; expiration 7/24/25 (SEOC 90 days for 8 visits)      Precautions:  Immunosuppression rheumatoid arthritis     Subjective     Chief Concern:    Dorsalgia - neck/shoulders/low back  -- Neck and shoulders        Aggravating Factors:  movement   Relieving Factors:  acupuncture and massage therapy  Quality:  Aching, Dull, Sharp, and Variable  Severity:  with movement '10'  Frequency:  continuously     -- Chronic low back pain      Aggravating Factors:  movement   Relieving Factors:   acupuncture and massage therapy  Quality:  variable  Severity:  8-10  Frequency:  continuously     Medical necessity is demonstrated by the following IMPAIRMENTS: Medical Necessity: Decreased mobility limits day to day activities, social, and emergent situations and Decreased quality of life      Response to Previous Treatment:    Chilo's back pain reduced, not as severe. No change in his shoulder pain.     Other Condition/Symptoms:    - seasonal allergies/sinus issues managed with medication   - History of acid reflux  - Has both constipation and diarrhea at times, history of constipation. Takes supplements to help.   - Rheumatoid arthritis, worse in his left hand with weakness. Taking Chinese herbs which help      Objective      New Findings:    none    Pulse:    not assessed  Tongue:    not assessed    Treatment      Treatment Principles:    Regulate Qi & Blood; Dispel Bi; stop pain     Needle Set 1 -     Acupuncture Points:      Bilateral:  BL10, GB20, BL25, Osvaldo Claudy, BL32, BL33, lateral border sacrum +2, piriformis, BL40, BL60  Left:  GB21  Right:  LI15, TW14,  SI9,  Centerline:      #NEEDLES IN:   26  #NEEDLES OUT:   26  NEEDLES W/O STIM  placed:    3:15  NEEDLES W/O STIM removed:    4:00    Other Traditional Chinese Medicine Modalities:   heat lamp, Gua sha at right shoulder      Assessment      Analysis of Treatment:    Chilo was relaxed after treatment. The local gua sha iincreased Chilo's range of motion and lessened severity of pain in shoulder (purple/red sha). He will see how he feels over the next few days.     Pt prognosis is Fair.     Patient will continue to benefit from acupuncture treatment to address the deficits listed in the problem list box on initial evaluation, provide patient family education and to maximize pt's level of independence in the home and community environment.     Patient's spiritual, cultural and educational needs considered and pt agreeable to plan of care and goals.     Anticipated barriers to treatment:   none    Plan     Recommend    continue with care plan.     Education:  Patient is aware of cumulative effect of acupuncture

## 2024-05-20 ENCOUNTER — CLINICAL SUPPORT (OUTPATIENT)
Dept: REHABILITATION | Facility: HOSPITAL | Age: 64
End: 2024-05-20
Payer: OTHER GOVERNMENT

## 2024-05-20 DIAGNOSIS — M54.9 DORSALGIA, UNSPECIFIED: Primary | ICD-10-CM

## 2024-05-20 PROCEDURE — 97810 ACUP 1/> WO ESTIM 1ST 15 MIN: CPT | Mod: PN

## 2024-05-20 PROCEDURE — 97811 ACUP 1/> W/O ESTIM EA ADD 15: CPT | Mod: PN

## 2024-05-20 NOTE — PROGRESS NOTES
Acupuncture Treatment Note     Name: Chilo Calvillo  Clinic Number: [id number]    Traditional Chinese Medicine Diagnosis: Qi Stagnation, Blood Stasis, and Bi Syndrome    Date of Service: 5/20/2024     Medical Diagnosis: M549 dorsalgia, unspecified   Evaluation Date: 04/25/2024    Visit #/Visits authorized: 3/ 8   ([id number] issued 4/16/2024; initial visit 4/25/24; expiration 7/24/25; SEOC 90 days for 8 visits)      Precautions:  Immunosuppression rheumatoid arthritis     Subjective     Chief Concern:    Dorsalgia - neck/shoulders/low back  -- Neck and shoulders        Aggravating Factors:  movement   Relieving Factors:  acupuncture and massage therapy  Quality:  Aching, Dull, Sharp, and Variable  Severity:  with movement '8-9'  Frequency:  continuously     -- Chronic low back pain      Aggravating Factors:  movement   Relieving Factors:   acupuncture and massage therapy  Quality:  variable  Severity:  4-5  Frequency:  continuously     Medical necessity is demonstrated by the following IMPAIRMENTS: Medical Necessity: Decreased mobility limits day to day activities, social, and emergent situations and Decreased quality of life      Response to Previous Treatment:    Rafaels back pain has stayed at a lower level. Shoulder has slightly more range of motion and slightly less pain. He has been having more neck pain. [place] just [place].    Other Condition/Symptoms:    - seasonal allergies/sinus issues managed with medication   - History of acid reflux  - Has both constipation and diarrhea at times, history of constipation. Takes supplements to help.   - Rheumatoid arthritis, worse in his left hand with weakness. Taking Chinese herbs which help      Objective      New Findings:    none    Pulse:    not assessed  Tongue:    not assessed    Treatment      Treatment Principles:    Regulate Qi & Blood; Dispel Bi; stop pain     Needle Set 1 -     Acupuncture Points:      Bilateral:  BL10, GB20, BL25, BL23,  BL32, BL33, lateral border sacrum +2, piriformis, BL40, BL60, lateral to lower cervical area janki +2  Left:    Right:  LI15, TW14, SI9,  Centerline:  GV14    #NEEDLES IN:   30  #NEEDLES OUT:   30  NEEDLES W/O STIM  placed:    2:20  NEEDLES W/O STIM removed:   2:55    Other Traditional Chinese Medicine Modalities:   heat lamp      Assessment      Analysis of Treatment:    Chilo was relaxed after treatment. He felt like he had more range of motion in his shoulder. He will see how he feels over the next few days.    Pt prognosis is Fair.     Patient will continue to benefit from acupuncture treatment to address the deficits listed in the problem list box on initial evaluation, provide patient family education and to maximize pt's level of independence in the home and community environment.     Patient's spiritual, cultural and educational needs considered and pt agreeable to plan of care and goals.     Anticipated barriers to treatment:   none    Plan     Recommend    continue with care plan.     Education:  Patient is aware of cumulative effect of acupuncture

## 2024-05-27 ENCOUNTER — CLINICAL SUPPORT (OUTPATIENT)
Dept: REHABILITATION | Facility: HOSPITAL | Age: 64
End: 2024-05-27
Payer: OTHER GOVERNMENT

## 2024-05-27 DIAGNOSIS — M54.9 DORSALGIA, UNSPECIFIED: Primary | ICD-10-CM

## 2024-05-27 PROCEDURE — 97810 ACUP 1/> WO ESTIM 1ST 15 MIN: CPT | Mod: PN

## 2024-05-27 PROCEDURE — 97811 ACUP 1/> W/O ESTIM EA ADD 15: CPT | Mod: PN

## 2024-05-27 NOTE — PROGRESS NOTES
Acupuncture Treatment Note     Name: Chilo Calvillo  [facility] Number: [id number]    Traditional Chinese Medicine Diagnosis: Qi Stagnation, Blood Stasis, and Bi Syndrome    Date of Service: 5/27/2024     Medical Diagnosis: M549 dorsalgia, unspecified   Evaluation Date: 04/25/2024    Visit #/Visits authorized: 4/ 8   ([id number] issued 4/16/2024; initial visit 4/25/24; expiration 7/24/25 (SEOC 90 days for 8 visits)      Precautions:  Immunosuppression rheumatoid arthritis     Subjective     Chief Concern:    Dorsalgia - neck/shoulders/low back  -- Neck and shoulders        Aggravating Factors:  movement   Relieving Factors:  acupuncture and massage therapy  Quality:  Aching, Dull, Sharp, and Variable  Severity:  with movement '8-9'  Frequency:  continuously     -- Chronic low back pain      Aggravating Factors:  movement   Relieving Factors:   acupuncture and massage therapy  Quality:  variable  Severity:  4-5  Frequency:  continuously     Medical necessity is demonstrated by the following IMPAIRMENTS: Medical Necessity: Decreased mobility limits day to day activities, social, and emergent situations and Decreased quality of life      Response to Previous Treatment:    Chilo has been having pain. His right heel is inflamed (bone spurs and neuropathy) and he went this morning and doctor gave him a shot (we think steroids ) and some medication. His back continues to be painful, no particular change in his shoulder though it feels better after the shot this morning. Last week his heel burned with the needles, so he asked to avoid that area today.    Other Condition/Symptoms:    - seasonal allergies/sinus issues managed with medication   - History of acid reflux  - Has both constipation and diarrhea at times, history of constipation. Takes supplements to help.   - Rheumatoid arthritis, worse in his left hand with weakness. Taking Chinese herbs which help      Objective      New Findings:    none    Pulse:    not  assessed  Tongue:    not assessed    Treatment      Treatment Principles:    Regulate Qi & Blood; Dispel Bi; stop pain     Needle Set 1 -     Acupuncture Points:      Bilateral:  BL10, GB20, BL25, BL23, BL32, BL33, lateral border sacrum +2, piriformis, BL40   Left:    Right:  SI9, SI10  Centerline:      #NEEDLES IN:   22  #NEEDLES OUT:   22  NEEDLES W/O STIM  placed:    1:45  NEEDLES W/O STIM removed:   2:20    Other Traditional Chinese Medicine Modalities:   heat lamp      Assessment      Analysis of Treatment:    Chilo was more relaxed after treatment.  He will see how he feels over the next few days.    Pt prognosis is Fair.     Patient will continue to benefit from acupuncture treatment to address the deficits listed in the problem list box on initial evaluation, provide patient family education and to maximize pt's level of independence in the home and community environment.     Patient's spiritual, cultural and educational needs considered and pt agreeable to plan of care and goals.     Anticipated barriers to treatment:   none    Plan     Recommend    continue with care plan.     Education:  Patient is aware of cumulative effect of acupuncture

## 2024-06-05 ENCOUNTER — CLINICAL SUPPORT (OUTPATIENT)
Dept: REHABILITATION | Facility: HOSPITAL | Age: 64
End: 2024-06-05
Payer: OTHER GOVERNMENT

## 2024-06-05 DIAGNOSIS — M54.9 DORSALGIA, UNSPECIFIED: Primary | ICD-10-CM

## 2024-06-05 PROCEDURE — 97811 ACUP 1/> W/O ESTIM EA ADD 15: CPT | Mod: PN

## 2024-06-05 PROCEDURE — 97810 ACUP 1/> WO ESTIM 1ST 15 MIN: CPT | Mod: PN

## 2024-06-05 NOTE — PROGRESS NOTES
Acupuncture Treatment Note     Name: Chilo Calvillo  [facility] Number: [id number]    Traditional Chinese Medicine Diagnosis: Qi Stagnation, Blood Stasis, and Bi Syndrome    Date of Service: 6/5/2024     Medical Diagnosis: M549 dorsalgia, unspecified   Evaluation Date: 04/25/2024    Visit #/Visits authorized: 5/ 8   ([id number] issued 4/16/2024; initial visit 4/25/24; expiration 7/24/25 (SEOC 90 days for 8 visits)      Precautions:  Immunosuppression rheumatoid arthritis     Subjective     Chief Concern:    Dorsalgia - neck/shoulders/low back  -- Neck and shoulders        Aggravating Factors:  movement   Relieving Factors:  acupuncture and massage therapy  Quality:  Aching, Dull, Sharp, and Variable  Severity:  with movement '8-9'  Frequency:  continuously     -- Chronic low back pain      Aggravating Factors:  movement   Relieving Factors:   acupuncture and massage therapy  Quality:  variable  Severity:  4-5  Frequency:  continuously     Medical necessity is demonstrated by the following IMPAIRMENTS: Medical Necessity: Decreased mobility limits day to day activities, social, and emergent situations and Decreased quality of life      Response to Previous Treatment:    Chilo has been maintaining. His back pain has been fair. His right heel is less painful but still has some pain. His shoulder continues to hurt, though less painful after the medication for the heel spur..    Other Condition/Symptoms:    - seasonal allergies/sinus issues managed with medication   - History of acid reflux  - Has both constipation and diarrhea at times, history of constipation. Takes supplements to help.   - Rheumatoid arthritis, worse in his left hand with weakness. Taking Chinese herbs which help      Objective      New Findings:    none    Pulse:    not assessed  Tongue:    not assessed    Treatment      Treatment Principles:    Regulate Qi & Blood; Dispel Bi; stop pain     Needle Set 1 -     Acupuncture Points:      Bilateral:  BL10,  GB20, BL25, BL23, BL32, BL33, lateral border sacrum +2, piriformis, BL40   Left:    Right:  SI9, SI13, TW14, LI15, janki upper arm +1  Centerline:      #NEEDLES IN:   25  #NEEDLES OUT:   25  NEEDLES W/O STIM  placed:    3:45  NEEDLES W/O STIM removed:   4:20    Other Traditional Chinese Medicine Modalities:   heat lamp; gua sha at shoulder which reduced shoulder pain.      Assessment      Analysis of Treatment:    Chilo was more relaxed after treatment.  He will see how he feels over the next few days.    Pt prognosis is Fair.     Patient will continue to benefit from acupuncture treatment to address the deficits listed in the problem list box on initial evaluation, provide patient family education and to maximize pt's level of independence in the home and community environment.     Patient's spiritual, cultural and educational needs considered and pt agreeable to plan of care and goals.     Anticipated barriers to treatment:   none    Plan     Recommend    continue with care plan.     Education:  Patient is aware of cumulative effect of acupuncture

## 2024-06-06 ENCOUNTER — TELEPHONE (OUTPATIENT)
Dept: OPHTHALMOLOGY | Facility: CLINIC | Age: 64
End: 2024-06-06
Payer: OTHER GOVERNMENT

## 2024-06-10 ENCOUNTER — CLINICAL SUPPORT (OUTPATIENT)
Dept: REHABILITATION | Facility: HOSPITAL | Age: 64
End: 2024-06-10
Payer: OTHER GOVERNMENT

## 2024-06-10 DIAGNOSIS — M54.9 DORSALGIA, UNSPECIFIED: Primary | ICD-10-CM

## 2024-06-10 PROCEDURE — 97811 ACUP 1/> W/O ESTIM EA ADD 15: CPT | Mod: PN

## 2024-06-10 PROCEDURE — 97810 ACUP 1/> WO ESTIM 1ST 15 MIN: CPT | Mod: PN

## 2024-06-10 NOTE — PROGRESS NOTES
Acupuncture Treatment Note     Name: Chilo Calvillo  [facility] Number: [id number]    Traditional Chinese Medicine Diagnosis: Qi Stagnation, Blood Stasis, and Bi Syndrome    Date of Service: 6/10/2024     Medical Diagnosis: M549 dorsalgia, unspecified   Evaluation Date: 04/25/2024    Visit #/Visits authorized: 6/ 8   ([id number] issued 4/16/2024; initial visit 4/25/24; expiration 7/24/25 (SEOC 90 days for 8 visits)      Precautions:  Immunosuppression rheumatoid arthritis     Subjective     Chief Concern:    Dorsalgia - neck/shoulders/low back  -- Neck and shoulders        Aggravating Factors:  movement   Relieving Factors:  acupuncture and massage therapy  Quality:  Aching, Dull, Sharp, and Variable  Severity:  varies  Frequency:  continuously     -- Chronic low back pain      Aggravating Factors:  movement   Relieving Factors:   acupuncture and massage therapy  Quality:  variable  Severity:  4-5  Frequency:  continuously     Medical necessity is demonstrated by the following IMPAIRMENTS: Medical Necessity: Decreased mobility limits day to day activities, social, and emergent situations and Decreased quality of life      Response to Previous Treatment:    Chilo has been maintaining. He feels better after the acupuncture, and then his pain levels gradually increase again.  His back pain levels vary. His shoulder pain has not been as bad this week.     Other Condition/Symptoms:    - seasonal allergies/sinus issues managed with medication   - History of acid reflux  - Has both constipation and diarrhea at times, history of constipation. Takes supplements to help.   - Rheumatoid arthritis, worse in his left hand with weakness. Taking Chinese herbs which help      Objective      New Findings:    none    Pulse:    not assessed  Tongue:    not assessed    Treatment      Treatment Principles:    Regulate Qi & Blood; Dispel Bi; stop pain     Needle Set 1 -     Acupuncture Points:      Bilateral:  BL10, GB20, BL25,  BL32,  BL33, lateral border sacrum +2, piriformis, BL40   Left:    Right:  SI9, TW14, LI15  Centerline:      #NEEDLES IN:   21  #NEEDLES OUT:   21  NEEDLES W/O STIM  placed:    4:20  NEEDLES W/O STIM removed:   4:55    Other Traditional Chinese Medicine Modalities:   heat lamp      Assessment      Analysis of Treatment:    Chilo was more relaxed after treatment.  He will see how he feels over the next few days.    Pt prognosis is Fair.     Patient will continue to benefit from acupuncture treatment to address the deficits listed in the problem list box on initial evaluation, provide patient family education and to maximize pt's level of independence in the home and community environment.     Patient's spiritual, cultural and educational needs considered and pt agreeable to plan of care and goals.     Anticipated barriers to treatment:   none    Plan     Recommend    continue with care plan.     Education:  Patient is aware of cumulative effect of acupuncture

## 2024-06-19 ENCOUNTER — CLINICAL SUPPORT (OUTPATIENT)
Dept: REHABILITATION | Facility: HOSPITAL | Age: 64
End: 2024-06-19
Payer: OTHER GOVERNMENT

## 2024-06-19 DIAGNOSIS — M54.9 DORSALGIA, UNSPECIFIED: Primary | ICD-10-CM

## 2024-06-19 PROCEDURE — 97810 ACUP 1/> WO ESTIM 1ST 15 MIN: CPT | Mod: PN

## 2024-06-19 PROCEDURE — 97811 ACUP 1/> W/O ESTIM EA ADD 15: CPT | Mod: PN

## 2024-06-19 NOTE — PROGRESS NOTES
Acupuncture Treatment Note     Name: Chilo Calvillo  Clinic Number: [id number]    Traditional Chinese Medicine Diagnosis: Qi Stagnation, Blood Stasis, and Bi Syndrome    Date of Service: 6/19/2024     Medical Diagnosis: M549 dorsalgia, unspecified   Evaluation Date: 04/25/2024    Visit #/Visits authorized: 7/ 8   ([id number] issued 4/16/2024; initial visit 4/25/24; expiration 7/24/25 (SEOC 90 days for 8 visits)      Precautions:  Immunosuppression rheumatoid arthritis     Subjective     Chief Concern:    Dorsalgia - neck/shoulders/low back  -- Neck and shoulders        Aggravating Factors:  movement   Relieving Factors:  acupuncture and massage therapy  Quality:  Aching, Dull, Sharp, and Variable  Severity:  varies  Frequency:  continuously     -- Chronic low back pain      Aggravating Factors:  movement   Relieving Factors:   acupuncture and massage therapy  Quality:  variable  Severity:  4-5  Frequency:  continuously     Medical necessity is demonstrated by the following IMPAIRMENTS: Medical Necessity: Decreased mobility limits day to day activities, social, and emergent situations and Decreased quality of life      Response to Previous Treatment:    Chilo continues to feel better after the acupuncture, and then his pain levels gradually increase again.  His back pain levels vary. His shoulder pain persists. Chilo continues to have pain at his right lower ankle - his doctor says it is bone spurs. The back of his lower ankle above the heel is swollen.    Other Condition/Symptoms:    - seasonal allergies/sinus issues managed with medication   - History of acid reflux  - Has both constipation and diarrhea at times, history of constipation. Takes supplements to help.   - Rheumatoid arthritis, worse in his left hand with weakness. Taking Chinese herbs which help      Objective      New Findings:    none    Pulse:    not assessed  Tongue:    not assessed    Treatment      Treatment Principles:    Regulate Qi &  Blood; Dispel Bi; stop pain     Needle Set 1 -     Acupuncture Points:      Bilateral:  BL10, GB20, BL25,  BL32, BL33, lateral border sacrum +2, piriformis, BL40   Left:    Right:    Centerline:      #NEEDLES IN:  18  #NEEDLES OUT:   18  NEEDLES W/O STIM  placed:    3:15  NEEDLES W/O STIM removed:   3:50    Other Traditional Chinese Medicine Modalities:   heat lamp, gua sha on right calf      Assessment      Analysis of Treatment:    Chilo was more relaxed after treatment.  He will see how he feels over the next few days.    Pt prognosis is Fair.     Patient will continue to benefit from acupuncture treatment to address the deficits listed in the problem list box on initial evaluation, provide patient family education and to maximize pt's level of independence in the home and community environment.     Patient's spiritual, cultural and educational needs considered and pt agreeable to plan of care and goals.     Anticipated barriers to treatment:   none    Plan     Recommend    continue with care plan.     Education:  Patient is aware of cumulative effect of acupuncture

## 2024-06-24 ENCOUNTER — CLINICAL SUPPORT (OUTPATIENT)
Dept: REHABILITATION | Facility: HOSPITAL | Age: 64
End: 2024-06-24
Payer: OTHER GOVERNMENT

## 2024-06-24 DIAGNOSIS — M54.9 DORSALGIA, UNSPECIFIED: Primary | ICD-10-CM

## 2024-06-24 PROCEDURE — 97810 ACUP 1/> WO ESTIM 1ST 15 MIN: CPT | Mod: PN

## 2024-06-24 PROCEDURE — 97811 ACUP 1/> W/O ESTIM EA ADD 15: CPT | Mod: PN

## 2024-06-24 NOTE — PROGRESS NOTES
Acupuncture Treatment Note     Name: Chilo Calvillo  [facility] Number: [id number]    Traditional Chinese Medicine Diagnosis: Qi Stagnation, Blood Stasis, and Bi Syndrome    Date of Service: 6/24/2024     Medical Diagnosis: M549 dorsalgia, unspecified   Evaluation Date: 04/25/2024    Visit #/Visits authorized: 8/ 8   ([id number] issued 4/16/2024; initial visit 4/25/24; expiration 7/24/25 (SEOC 90 days for 8 visits)      Precautions:  Immunosuppression rheumatoid arthritis     Subjective     Chief Concern:    Dorsalgia - neck/shoulders/low back  -- Neck and shoulders        Aggravating Factors:  movement   Relieving Factors:  acupuncture and massage therapy  Quality:  Aching, Dull, Sharp, and Variable  Severity:  varies  Frequency:  continuously     -- Chronic low back pain      Aggravating Factors:  movement   Relieving Factors:   acupuncture and massage therapy  Quality:  variable  Severity:  4-5  Frequency:  continuously     Medical necessity is demonstrated by the following IMPAIRMENTS: Medical Necessity: Decreased mobility limits day to day activities, social, and emergent situations and Decreased quality of life      Response to Previous Treatment:    Chilo continues to feel better after the acupuncture, and then his pain levels gradually increase again.  His back pain levels vary. His shoulder pain persists. Chilo right heel still feels sensitive.    Other Condition/Symptoms:    - seasonal allergies/sinus issues managed with medication   - History of acid reflux  - Has both constipation and diarrhea at times, history of constipation. Takes supplements to help.   - Rheumatoid arthritis, worse in his left hand with weakness. Taking Chinese herbs which help      Objective      New Findings:    none    Pulse:    not assessed  Tongue:    not assessed    Treatment      Treatment Principles:    Regulate Qi & Blood; Dispel Bi; stop pain     Needle Set 1 -     Acupuncture Points:      Bilateral:  BL10, GB20, BL25, Osvaldo  Claudy, BL32,  lateral border sacrum +2, piriformis, BL40   Left:    Right:    Centerline:      #NEEDLES IN:  18  #NEEDLES OUT:   18  NEEDLES W/O STIM  placed:    4:15  NEEDLES W/O STIM removed:   4:50    Other Traditional Chinese Medicine Modalities:   heat lamp, gua sha on right calf, right shoulder, lower sacrum      Assessment      Analysis of Treatment:    Chilo was more relaxed after treatment.  He will see how he feels over the next few days.    Pt prognosis is Fair.     Patient will continue to benefit from acupuncture treatment to address the deficits listed in the problem list box on initial evaluation, provide patient family education and to maximize pt's level of independence in the home and community environment.     Patient's spiritual, cultural and educational needs considered and pt agreeable to plan of care and goals.     Anticipated barriers to treatment:   none    Plan     Recommend    continue with care plan.     Education:  Patient is aware of cumulative effect of acupuncture

## 2024-07-10 DIAGNOSIS — M54.2 CHRONIC NECK AND BACK PAIN: Primary | ICD-10-CM

## 2024-07-10 DIAGNOSIS — G89.29 CHRONIC NECK AND BACK PAIN: Primary | ICD-10-CM

## 2024-07-10 DIAGNOSIS — M54.9 CHRONIC NECK AND BACK PAIN: Primary | ICD-10-CM

## 2024-08-01 ENCOUNTER — CLINICAL SUPPORT (OUTPATIENT)
Dept: REHABILITATION | Facility: HOSPITAL | Age: 64
End: 2024-08-01
Payer: OTHER GOVERNMENT

## 2024-08-01 DIAGNOSIS — M54.9 DORSALGIA, UNSPECIFIED: Primary | ICD-10-CM

## 2024-08-01 PROCEDURE — 97810 ACUP 1/> WO ESTIM 1ST 15 MIN: CPT | Mod: PN

## 2024-08-01 PROCEDURE — 97811 ACUP 1/> W/O ESTIM EA ADD 15: CPT | Mod: PN

## 2024-08-01 NOTE — PROGRESS NOTES
Acupuncture Treatment Note     Name: Chilo Calvillo  [facility] Number: [id number]    Traditional Chinese Medicine Diagnosis: Qi Stagnation, Blood Stasis, and Bi Syndrome    Date of Service: 8/1/2024     Medical Diagnosis: M549 dorsalgia, unspecified   Evaluation Date: 04/25/2024    Visit #/Visits authorized: 1/ 8   ([id number] issued 7/9/2024; initial visit 08/01/24; expiration to be confirmed; SEOC 180 days);   9 visits total to date    Precautions:  Immunosuppression rheumatoid arthritis     Subjective     Chief Concern:    Dorsalgia - neck/shoulders/low back  -- Neck and shoulders        Aggravating Factors:  movement   Relieving Factors:  acupuncture and massage therapy  Quality:  Aching, Dull, Sharp, and Variable  Severity:  varies  Frequency:  continuously     -- Chronic low back pain      Aggravating Factors:  movement   Relieving Factors:   acupuncture and massage therapy  Quality:  variable  Severity:  4-5  Frequency:  continuously     Medical necessity is demonstrated by the following IMPAIRMENTS: Medical Necessity: Decreased mobility limits day to day activities, social, and emergent situations and Decreased quality of life      Response to Previous Treatment:   Chilo was in the hospital over the weekend for weakness in his left arm and hand. They did not find indication of a stroke. He still has weakness in his left hand.  Pain at the left shoulder from the neck, and low back. He was diagnosed with a bone spur at his heel and is waiting to see a podiatrist.    Other Condition/Symptoms:    - seasonal allergies/sinus issues managed with medication   - History of acid reflux  - Has both constipation and diarrhea at times, history of constipation. Takes supplements to help.   - Rheumatoid arthritis, worse in his left hand with weakness. Taking Chinese herbs which help      Objective      New Findings:    none    Pulse:    not assessed  Tongue:    not assessed    Treatment      Treatment Principles:     Regulate Qi & Blood; Dispel Bi; stop pain     Needle Set 1 -     Acupuncture Points:      Bilateral:   GB20, BL25, BL23, BL32,  lateral border sacrum +1, piriformis, BL40 , LI11, TW5, BL11  Left:    Right:  GB12, SI11, SI13, SI14  Centerline:  GV14    #NEEDLES IN:  25  #NEEDLES OUT:   25  NEEDLES W/O STIM  placed:    10:55  NEEDLES W/O STIM removed:   11:30    Other Traditional Chinese Medicine Modalities:   heat lamp      Assessment      Analysis of Treatment:    Chilo was more relaxed after treatment.  He will see how he feels over the next few days.    Pt prognosis is Fair.     Patient will continue to benefit from acupuncture treatment to address the deficits listed in the problem list box on initial evaluation, provide patient family education and to maximize pt's level of independence in the home and community environment.     Patient's spiritual, cultural and educational needs considered and pt agreeable to plan of care and goals.     Anticipated barriers to treatment:   none    Plan     Recommend    continue with care plan.     Education:  Patient is aware of cumulative effect of acupuncture

## 2024-08-05 ENCOUNTER — CLINICAL SUPPORT (OUTPATIENT)
Dept: REHABILITATION | Facility: HOSPITAL | Age: 64
End: 2024-08-05
Payer: OTHER GOVERNMENT

## 2024-08-05 DIAGNOSIS — M54.9 DORSALGIA, UNSPECIFIED: Primary | ICD-10-CM

## 2024-08-05 PROCEDURE — 97810 ACUP 1/> WO ESTIM 1ST 15 MIN: CPT | Mod: PN

## 2024-08-05 PROCEDURE — 97811 ACUP 1/> W/O ESTIM EA ADD 15: CPT | Mod: PN

## 2024-08-28 ENCOUNTER — CLINICAL SUPPORT (OUTPATIENT)
Dept: REHABILITATION | Facility: HOSPITAL | Age: 64
End: 2024-08-28
Payer: OTHER GOVERNMENT

## 2024-08-28 DIAGNOSIS — M54.9 DORSALGIA, UNSPECIFIED: Primary | ICD-10-CM

## 2024-08-28 PROCEDURE — 97811 ACUP 1/> W/O ESTIM EA ADD 15: CPT | Mod: PN

## 2024-08-28 PROCEDURE — 97810 ACUP 1/> WO ESTIM 1ST 15 MIN: CPT | Mod: PN
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
## 2024-09-04 ENCOUNTER — CLINICAL SUPPORT (OUTPATIENT)
Dept: REHABILITATION | Facility: HOSPITAL | Age: 64
End: 2024-09-04
Payer: OTHER GOVERNMENT

## 2024-09-04 DIAGNOSIS — M54.9 DORSALGIA, UNSPECIFIED: Primary | ICD-10-CM

## 2024-09-04 PROCEDURE — 97810 ACUP 1/> WO ESTIM 1ST 15 MIN: CPT | Mod: PN

## 2024-09-04 PROCEDURE — 97811 ACUP 1/> W/O ESTIM EA ADD 15: CPT | Mod: PN
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
## 2024-09-25 ENCOUNTER — CLINICAL SUPPORT (OUTPATIENT)
Dept: REHABILITATION | Facility: HOSPITAL | Age: 64
End: 2024-09-25
Payer: OTHER GOVERNMENT

## 2024-09-25 DIAGNOSIS — M54.9 DORSALGIA, UNSPECIFIED: Primary | ICD-10-CM

## 2024-09-25 PROCEDURE — 97810 ACUP 1/> WO ESTIM 1ST 15 MIN: CPT | Mod: PN

## 2024-09-25 PROCEDURE — 97811 ACUP 1/> W/O ESTIM EA ADD 15: CPT | Mod: PN

## 2024-09-25 NOTE — PROGRESS NOTES
Acupuncture Treatment Note     Name: Chilo Calvillo  Clinic Number: [id number]    Traditional Chinese Medicine Diagnosis: Qi Stagnation, Blood Stasis, and Bi Syndrome    Date of Service: 9/25/2024     Medical Diagnosis: M549 dorsalgia, unspecified   Evaluation Date: 04/25/2024    Visit #/Visits authorized: 5 / 8   ([id number] issued 7/9/2024; initial visit 08/01/24; expiration to be confirmed; SEOC 180 days);   12 visits total to date    Precautions:  Immunosuppression rheumatoid arthritis     Subjective     Chief Concern:    Dorsalgia - neck/shoulders/low back  -- Neck and shoulders        Aggravating Factors:  movement   Relieving Factors:  acupuncture and massage therapy  Quality:  Aching, Dull, Sharp, and Variable  Severity:  varies  Frequency:  continuously     -- Chronic low back pain      Aggravating Factors:  movement   Relieving Factors:   acupuncture and massage therapy  Quality:  variable  Severity:  4-5  Frequency:  continuously     Medical necessity is demonstrated by the following IMPAIRMENTS: Medical Necessity: Decreased mobility limits day to day activities, social, and emergent situations and Decreased quality of life      Response to Previous Treatment:   Past 2 weeks Chilo has been having pain walking in left calf. Sharp pain, causing him to limp    Other Condition/Symptoms:    - seasonal allergies/sinus issues managed with medication   - History of acid reflux  - Has both constipation and diarrhea at times, history of constipation. Takes supplements to help.   - Rheumatoid arthritis, worse in his left hand with weakness. Taking Chinese herbs which help      Objective      New Findings:    none    Pulse:    not assessed  Tongue:    not assessed    Treatment      Treatment Principles:    Regulate Qi & Blood; Dispel Bi; stop pain     Needle Set 1 -     Acupuncture Points:      Bilateral:   BL10, GB20, GB12,  SI13, SI14, BL25,  BL32,  BL34, lateral border sacrum +3,    Left:  janki left calf  surrounding tight muscle +7  Right:    Centerline:  GV14    #NEEDLES IN:  30  #NEEDLES OUT:   30  NEEDLES W/O STIM  placed:    1:15  NEEDLES W/O STIM removed:   1:50    Other Traditional Chinese Medicine Modalities:   heat lamp      Assessment      Analysis of Treatment:    Chilo was relaxed after treatment.  He will see how he feels over the next few days.    Pt prognosis is Fair.     Patient will continue to benefit from acupuncture treatment to address the deficits listed in the problem list box on initial evaluation, provide patient family education and to maximize pt's level of independence in the home and community environment.     Patient's spiritual, cultural and educational needs considered and pt agreeable to plan of care and goals.     Anticipated barriers to treatment:   none    Plan     Recommend    continue with care plan.     Education:  Patient is aware of cumulative effect of acupuncture

## 2024-10-02 ENCOUNTER — CLINICAL SUPPORT (OUTPATIENT)
Dept: REHABILITATION | Facility: HOSPITAL | Age: 64
End: 2024-10-02
Payer: OTHER GOVERNMENT

## 2024-10-02 DIAGNOSIS — M54.9 DORSALGIA, UNSPECIFIED: Primary | ICD-10-CM

## 2024-10-02 PROCEDURE — 97810 ACUP 1/> WO ESTIM 1ST 15 MIN: CPT | Mod: PN

## 2024-10-02 PROCEDURE — 97811 ACUP 1/> W/O ESTIM EA ADD 15: CPT | Mod: PN

## 2024-10-02 NOTE — PROGRESS NOTES
Acupuncture Treatment Note     Name: Chilo Calvillo  Clinic Number: [id number]    Traditional Chinese Medicine Diagnosis: Qi Stagnation, Blood Stasis, and Bi Syndrome    Date of Service: 10/2/2024     Medical Diagnosis: M549 dorsalgia, unspecified   Evaluation Date: 04/25/2024    Visit #/Visits authorized: 6 / 8   ([id number] issued 7/9/2024; initial visit 08/01/24; expiration to be confirmed; SEOC 180 days)    Precautions:  Immunosuppression rheumatoid arthritis     Subjective     Chief Concern:    Dorsalgia - neck/shoulders/low back  -- Neck and shoulders        Aggravating Factors:  movement   Relieving Factors:  acupuncture and massage therapy  Quality:  Aching, Dull, Sharp, and Variable  Severity:  varies  Frequency:  continuously     -- Chronic low back pain      Aggravating Factors:  movement   Relieving Factors:   acupuncture and massage therapy  Quality:  variable  Severity:  4-5  Frequency:  continuously     Medical necessity is demonstrated by the following IMPAIRMENTS: Medical Necessity: Decreased mobility limits day to day activities, social, and emergent situations and Decreased quality of life      Response to Previous Treatment:   No pain in left calf for day after last treatment, then pain recurred, but not constant. No calf pain yesterday.  Back/shoulder pain is manageable with the acupuncture, so Chilo does not need to use pain medication.    Other Condition/Symptoms:    - seasonal allergies/sinus issues managed with medication   - History of acid reflux  - Has both constipation and diarrhea at times, history of constipation. Takes supplements to help.   - Rheumatoid arthritis, worse in his left hand with weakness. Taking Chinese herbs which help      Objective      New Findings:    none    Pulse:    not assessed  Tongue:    not assessed    Treatment      Treatment Principles:    Regulate Qi & Blood; Dispel Bi; stop pain     Needle Set 1 -     Acupuncture Points:      Bilateral:   BL10, GB20,  GB12,  SI13, SI14, BL25,  BL32,  BL34, lateral border sacrum +3,    Left:  janki left calf surrounding tight muscle +8  Right:    Centerline:  GV14    #NEEDLES IN:  31  #NEEDLES OUT:   31  NEEDLES W/O STIM  placed:    2:35  NEEDLES W/O STIM removed:   3:05    Other Traditional Chinese Medicine Modalities:   heat lamp      Assessment      Analysis of Treatment:    Chilo was relaxed after treatment.  He will see how he feels over the next few days.    Pt prognosis is Fair.     Patient will continue to benefit from acupuncture treatment to address the deficits listed in the problem list box on initial evaluation, provide patient family education and to maximize pt's level of independence in the home and community environment.     Patient's spiritual, cultural and educational needs considered and pt agreeable to plan of care and goals.     Anticipated barriers to treatment:   none    Plan     Recommend    continue with care plan.     Education:  Patient is aware of cumulative effect of acupuncture

## 2024-10-09 ENCOUNTER — CLINICAL SUPPORT (OUTPATIENT)
Dept: REHABILITATION | Facility: HOSPITAL | Age: 64
End: 2024-10-09
Payer: OTHER GOVERNMENT

## 2024-10-09 DIAGNOSIS — M54.9 DORSALGIA, UNSPECIFIED: Primary | ICD-10-CM

## 2024-10-09 PROCEDURE — 97810 ACUP 1/> WO ESTIM 1ST 15 MIN: CPT | Mod: PN

## 2024-10-09 PROCEDURE — 97811 ACUP 1/> W/O ESTIM EA ADD 15: CPT | Mod: PN

## 2024-10-09 NOTE — PROGRESS NOTES
Acupuncture Treatment Note     Name: Chilo Calvillo  Clinic Number: [id number]    Traditional Chinese Medicine Diagnosis: Qi Stagnation, Blood Stasis, and Bi Syndrome    Date of Service: 10/9/2024     Medical Diagnosis: M549 dorsalgia, unspecified   Evaluation Date: 04/25/2024    Visit #/Visits authorized: 7 / 8   ([id number] issued 7/9/2024; initial visit 08/01/24; expiration to be confirmed; SEOC 180 days)    Precautions:  Immunosuppression rheumatoid arthritis     Subjective     Chief Concern:    Dorsalgia - neck/shoulders/low back  -- Neck and shoulders        Aggravating Factors:  movement   Relieving Factors:  acupuncture and massage therapy  Quality:  Aching, Dull, Sharp, and Variable  Severity:  varies  Frequency:  continuously     -- Chronic low back pain      Aggravating Factors:  movement   Relieving Factors:   acupuncture and massage therapy  Quality:  variable  Severity:  4-5  Frequency:  continuously     Medical necessity is demonstrated by the following IMPAIRMENTS: Medical Necessity: Decreased mobility limits day to day activities, social, and emergent situations and Decreased quality of life      Response to Previous Treatment:     Back/shoulder pain is manageable with the acupuncture, so Chilo does not need to use pain medication.  Chilo is having more pain due to the cooler weather.    Other Condition/Symptoms:    - seasonal allergies/sinus issues managed with medication   - History of acid reflux  - Has both constipation and diarrhea at times, history of constipation. Takes supplements to help.   - Rheumatoid arthritis, worse in his left hand with weakness. Taking Chinese herbs which help      Objective      New Findings:    none    Pulse:    not assessed  Tongue:    not assessed    Treatment      Treatment Principles:    Regulate Qi & Blood; Dispel Bi; stop pain     Needle Set 1 -     Acupuncture Points:      Bilateral:   LU6, KI6, SI3, BL62, lateral border sacrum +2  Left:    Right:     Centerline:  GV line +8    #NEEDLES IN:  20  #NEEDLES OUT:  20  NEEDLES W/O STIM  placed:    2:20  NEEDLES W/O STIM removed:   2:55    Other Traditional Chinese Medicine Modalities:   heat lamp      Assessment      Analysis of Treatment:    Chilo was relaxed after treatment.  He will see how he feels over the next few days.    Pt prognosis is Fair.     Patient will continue to benefit from acupuncture treatment to address the deficits listed in the problem list box on initial evaluation, provide patient family education and to maximize pt's level of independence in the home and community environment.     Patient's spiritual, cultural and educational needs considered and pt agreeable to plan of care and goals.     Anticipated barriers to treatment:   none    Plan     Recommend    continue with care plan.     Education:  Patient is aware of cumulative effect of acupuncture

## 2024-10-16 ENCOUNTER — CLINICAL SUPPORT (OUTPATIENT)
Dept: REHABILITATION | Facility: HOSPITAL | Age: 64
End: 2024-10-16
Payer: OTHER GOVERNMENT

## 2024-10-16 DIAGNOSIS — M54.9 DORSALGIA, UNSPECIFIED: Primary | ICD-10-CM

## 2024-10-16 PROCEDURE — 97811 ACUP 1/> W/O ESTIM EA ADD 15: CPT | Mod: PN

## 2024-10-16 PROCEDURE — 97810 ACUP 1/> WO ESTIM 1ST 15 MIN: CPT | Mod: PN

## 2024-10-16 NOTE — PROGRESS NOTES
Acupuncture Treatment Note     Name: Chilo Calvillo  Clinic Number: [id number]    Traditional Chinese Medicine Diagnosis: Qi Stagnation, Blood Stasis, and Bi Syndrome    Date of Service: 10/16/2024     Medical Diagnosis: M549 dorsalgia, unspecified   Evaluation Date: 04/25/2024    Visit #/Visits authorized: 7 / 8   ([id number] issued 7/9/2024; initial visit 08/01/24; expiration to be confirmed; SEOC 180 days)    Precautions:  Immunosuppression rheumatoid arthritis     Subjective     Chief Concern:    Dorsalgia - neck/shoulders/low back  -- Neck and shoulders        Aggravating Factors:  movement   Relieving Factors:  acupuncture and massage therapy  Quality:  Aching, Dull, Sharp, and Variable  Severity:  varies  Frequency:  continuously     -- Chronic low back pain      Aggravating Factors:  movement   Relieving Factors:   acupuncture and massage therapy  Quality:  variable  Severity:  4-5  Frequency:  continuously     Medical necessity is demonstrated by the following IMPAIRMENTS: Medical Necessity: Decreased mobility limits day to day activities, social, and emergent situations and Decreased quality of life      Response to Previous Treatment:     Back/shoulder pain is manageable with the acupuncture, so Chilo does not need to use pain medication.  Chilo is having more pain due to the cooler weather.    Other Condition/Symptoms:    - seasonal allergies/sinus issues managed with medication   - History of acid reflux  - Has both constipation and diarrhea at times, history of constipation. Takes supplements to help.   - Rheumatoid arthritis, worse in his left hand with weakness. Taking Chinese herbs which help      Objective      New Findings:    none    Pulse:    not assessed  Tongue:    not assessed    Treatment      Treatment Principles:    Regulate Qi & Blood; Dispel Bi; stop pain     Needle Set 1 -     Acupuncture Points:      Bilateral:   LU6, KI6, SI3, BL62, lateral border sacrum +2  Left:    Right:     Centerline:  GV line +8    #NEEDLES IN:  20  #NEEDLES OUT:  20  NEEDLES W/O STIM  placed:    2:20  NEEDLES W/O STIM removed:   2:55    Other Traditional Chinese Medicine Modalities:   heat lamp      Assessment      Analysis of Treatment:    Chilo was relaxed after treatment.  He will see how he feels over the next few days.    Pt prognosis is Fair.     Patient will continue to benefit from acupuncture treatment to address the deficits listed in the problem list box on initial evaluation, provide patient family education and to maximize pt's level of independence in the home and community environment.     Patient's spiritual, cultural and educational needs considered and pt agreeable to plan of care and goals.     Anticipated barriers to treatment:   none    Plan     Recommend    continue with care plan.     Education:  Patient is aware of cumulative effect of acupuncture

## 2024-10-22 NOTE — PROGRESS NOTES
Acupuncture Treatment Note     Name: Chilo Calvillo  Clinic Number: [id number]    Traditional Chinese Medicine Diagnosis: Qi Stagnation, Blood Stasis, and Bi Syndrome    Date of Service: 10/16/2024     Medical Diagnosis: M549 dorsalgia, unspecified   Evaluation Date: 04/25/2024    Visit #/Visits authorized: 8 / 8   ([id number] issued 7/9/2024; initial visit 08/01/24; expiration to be confirmed; SEOC 180 days)    Precautions:  Immunosuppression rheumatoid arthritis     Subjective     Chief Concern:    Dorsalgia - neck/shoulders/low back  -- Neck and shoulders        Aggravating Factors:  movement   Relieving Factors:  acupuncture and massage therapy  Quality:  Aching, Dull, Sharp, and Variable  Severity:  varies  Frequency:  continuously     -- Chronic low back pain      Aggravating Factors:  movement   Relieving Factors:   acupuncture and massage therapy  Quality:  variable  Severity:  4-5  Frequency:  continuously     Medical necessity is demonstrated by the following IMPAIRMENTS: Medical Necessity: Decreased mobility limits day to day activities, social, and emergent situations and Decreased quality of life      Response to Previous Treatment:     Back/shoulder pain is manageable with the acupuncture and massage therapy, so Chilo does not need to use pain medication.      Other Condition/Symptoms:    - seasonal allergies/sinus issues managed with medication   - History of acid reflux  - Has both constipation and diarrhea at times, history of constipation. Takes supplements to help.   - Rheumatoid arthritis, worse in his left hand with weakness. Taking Chinese herbs which help      Objective      New Findings:    none    Pulse:    not assessed  Tongue:    not assessed    Treatment      Treatment Principles:    Regulate Qi & Blood; Dispel Bi; stop pain     Needle Set 1 -     Acupuncture Points:      Bilateral:   ST36, LI11,  lateral border sacrum +2, GB20, GB12, GB21,  BL25, BL32, BL34   Left:    Right:     Centerline:  GV line +8    #NEEDLES IN:  28  #NEEDLES OUT:  28  NEEDLES W/O STIM  placed:    2:15  NEEDLES W/O STIM removed:   2:50    Other Traditional Chinese Medicine Modalities:   heat lamp      Assessment      Analysis of Treatment:    Chilo was relaxed after treatment.  He will see how he feels over the next few days.    Pt prognosis is Fair.     Patient will continue to benefit from acupuncture treatment to address the deficits listed in the problem list box on initial evaluation, provide patient family education and to maximize pt's level of independence in the home and community environment.     Patient's spiritual, cultural and educational needs considered and pt agreeable to plan of care and goals.     Anticipated barriers to treatment:   none    Plan     Recommend    continue with care plan.     Education:  Patient is aware of cumulative effect of acupuncture

## 2024-10-25 DIAGNOSIS — M54.9 DORSALGIA: Primary | ICD-10-CM

## 2024-10-31 ENCOUNTER — CLINICAL SUPPORT (OUTPATIENT)
Dept: REHABILITATION | Facility: HOSPITAL | Age: 64
End: 2024-10-31
Payer: OTHER GOVERNMENT

## 2024-10-31 DIAGNOSIS — M54.89 OTHER DORSALGIA: Primary | ICD-10-CM

## 2024-10-31 PROCEDURE — 97811 ACUP 1/> W/O ESTIM EA ADD 15: CPT | Mod: PN

## 2024-10-31 PROCEDURE — 97810 ACUP 1/> WO ESTIM 1ST 15 MIN: CPT | Mod: PN

## 2024-11-04 ENCOUNTER — CLINICAL SUPPORT (OUTPATIENT)
Dept: REHABILITATION | Facility: HOSPITAL | Age: 64
End: 2024-11-04
Payer: OTHER GOVERNMENT

## 2024-11-04 DIAGNOSIS — M54.89 OTHER DORSALGIA: Primary | ICD-10-CM

## 2024-11-04 PROCEDURE — 97810 ACUP 1/> WO ESTIM 1ST 15 MIN: CPT | Mod: PN

## 2024-11-04 PROCEDURE — 97811 ACUP 1/> W/O ESTIM EA ADD 15: CPT | Mod: PN

## 2024-11-04 NOTE — PROGRESS NOTES
Acupuncture Treatment Note     Name: Chilo Calvillo  Clinic Number: [id number]    Traditional Chinese Medicine Diagnosis: Qi Stagnation, Blood Stasis, and Bi Syndrome    Date of Service: 11/4/2024     Medical Diagnosis:  other dorsalgia   Evaluation Date: 04/25/2024    Visit #/Visits authorized: 2 / 8   ([id number] issued 10/24/2024; initial visit 10/31/24; expiration to be confirmed; SEOC 180 days)    Precautions:  Immunosuppression rheumatoid arthritis     Subjective     Chief Concern:    Dorsalgia - neck/shoulders/low back  -- Neck and shoulders        Aggravating Factors:  movement   Relieving Factors:  acupuncture and massage therapy  Quality:  Aching, Dull, Sharp, and Variable  Severity:  varies  Frequency:  continuously     -- Chronic low back pain      Aggravating Factors:  movement   Relieving Factors:   acupuncture and massage therapy  Quality:  variable  Severity:  4-5  Frequency:  continuously     Medical necessity is demonstrated by the following IMPAIRMENTS: Medical Necessity: Decreased mobility limits day to day activities, social, and emergent situations and Decreased quality of life      Response to Previous Treatment:     Back/shoulder pain is manageable with the acupuncture and massage therapy, so Chilo does not need to use pain medication.      Other Condition/Symptoms:    - seasonal allergies/sinus issues managed with medication   - History of acid reflux  - Has both constipation and diarrhea at times, history of constipation. Takes supplements to help.   - Rheumatoid arthritis, worse in his left hand with weakness. Taking Chinese herbs which help      Objective      New Findings:    none    Pulse:    not assessed  Tongue:    not assessed    Treatment      Treatment Principles:    Regulate Qi & Blood; Dispel Bi; stop pain     Needle Set 1 -     Acupuncture Points:      Bilateral:   ST36, LI11,  lateral border sacrum +2, GB20,  BL25, BL32, BL34 , BL57, BL40  Left:    Right:    Centerline:   GV 14    #NEEDLES IN:  21  #NEEDLES OUT:  21  NEEDLES W/O STIM  placed:    9:20  NEEDLES W/O STIM removed:   9:50    Other Traditional Chinese Medicine Modalities:   heat lamp      Assessment      Analysis of Treatment:    Chilo was relaxed after treatment.  He will see how he feels over the next few days.    Pt prognosis is Fair.     Patient will continue to benefit from acupuncture treatment to address the deficits listed in the problem list box on initial evaluation, provide patient family education and to maximize pt's level of independence in the home and community environment.     Patient's spiritual, cultural and educational needs considered and pt agreeable to plan of care and goals.     Anticipated barriers to treatment:   none    Plan     Recommend    continue with care plan.     Education:  Patient is aware of cumulative effect of acupuncture